# Patient Record
Sex: FEMALE | Race: WHITE | NOT HISPANIC OR LATINO | Employment: FULL TIME | URBAN - METROPOLITAN AREA
[De-identification: names, ages, dates, MRNs, and addresses within clinical notes are randomized per-mention and may not be internally consistent; named-entity substitution may affect disease eponyms.]

---

## 2017-01-21 ENCOUNTER — OFFICE VISIT (OUTPATIENT)
Dept: URGENT CARE | Facility: URGENT CARE | Age: 42
End: 2017-01-21
Payer: COMMERCIAL

## 2017-01-21 VITALS
OXYGEN SATURATION: 99 % | HEART RATE: 61 BPM | BODY MASS INDEX: 31.07 KG/M2 | HEIGHT: 68 IN | DIASTOLIC BLOOD PRESSURE: 74 MMHG | SYSTOLIC BLOOD PRESSURE: 102 MMHG | TEMPERATURE: 98.2 F | WEIGHT: 205 LBS

## 2017-01-21 DIAGNOSIS — R05.9 COUGH: Primary | ICD-10-CM

## 2017-01-21 DIAGNOSIS — J06.9 VIRAL URI: ICD-10-CM

## 2017-01-21 PROCEDURE — 99203 OFFICE O/P NEW LOW 30 MIN: CPT | Performed by: FAMILY MEDICINE

## 2017-01-21 RX ORDER — CODEINE PHOSPHATE AND GUAIFENESIN 10; 100 MG/5ML; MG/5ML
1-2 SOLUTION ORAL EVERY 6 HOURS PRN
Qty: 120 ML | Refills: 0 | Status: SHIPPED | OUTPATIENT
Start: 2017-01-21 | End: 2022-12-06

## 2017-01-21 RX ORDER — PREDNISONE 20 MG/1
20 TABLET ORAL 2 TIMES DAILY
Qty: 10 TABLET | Refills: 0 | Status: SHIPPED | OUTPATIENT
Start: 2017-01-21 | End: 2022-12-06

## 2017-01-21 NOTE — Clinical Note
January 21, 2017    Poly Blanco  [address] [facility] [address]            To whom it may concern:     This is to inform you that this patient needs to be off work for three days starting tomorrow due to her illness.   Please let me know if you have any questions.       Sincerely,               Lacy Davidson MD

## 2017-01-21 NOTE — MR AVS SNAPSHOT
"              After Visit Summary   2017    Poly Blanco    MRN: [id number]           Patient Information     Date Of Birth          1975        Visit Information        Provider Department      2017 6:45 PM Lacy Davidson MD [facility]        Today's Diagnoses     Cough    -  1     Viral URI            Follow-ups after your visit        Who to contact     If you have questions or need follow up information about today's clinic visit or your schedule please contact [facility] directly at [phone].  Normal or non-critical lab and imaging results will be communicated to you by [employer], letter or phone within 4 business days after the clinic has received the results. If you do not hear from us within 7 days, please contact the clinic through [employer] or phone. If you have a critical or abnormal lab result, we will notify you by phone as soon as possible.  Submit refill requests through [employer] or call your pharmacy and they will forward the refill request to us. Please allow 3 business days for your refill to be completed.          Additional Information About Your Visit        MyChart Information     [employer] lets you send messages to your doctor, view your test results, renew your prescriptions, schedule appointments and more. To sign up, go to [email or web address] . Click on \"Log in\" on the left side of the screen, which will take you to the Welcome page. Then click on \"Sign up Now\" on the right side of the page.     You will be asked to enter the access code listed below, as well as some personal information. Please follow the directions to create your username and password.     Your access code is: [email or web address]  Expires: 2017  7:24 PM     Your access code will  in 90 days. If you need help or a new code, please call your [facility] clinic or [phone].        Care EveryWhere ID     This is your Care EveryWhere ID. This could be used by " "other organizations to access your [facility] medical records  [id number]        Your Vitals Were     Pulse Temperature Height BMI (Body Mass Index) Pulse Oximetry Breastfeeding?    61 98.2  F (36.8  C) (Tympanic) 5' 8\" (1.727 m) 31.18 kg/m2 99% No       Blood Pressure from Last 3 Encounters:   01/21/17 102/74   07/05/12 105/77   07/05/12 116/75    Weight from Last 3 Encounters:   01/21/17 205 lb (92.987 kg)   07/05/12 210 lb (95.255 kg)   07/05/12 210 lb (95.255 kg)              Today, you had the following     No orders found for display         Today's Medication Changes          These changes are accurate as of: 1/21/17  7:24 PM.  If you have any questions, ask your nurse or doctor.               Start taking these medicines.        Dose/Directions    guaiFENesin-codeine 100-10 MG/5ML Soln solution   Commonly known as:  ROBITUSSIN AC   Used for:  Cough   Started by:  Lacy Davidson MD        Dose:  1-2 tsp.   Take 5-10 mLs by mouth every 6 hours as needed for cough   Quantity:  120 mL   Refills:  0       predniSONE 20 MG tablet   Commonly known as:  DELTASONE   Used for:  Cough   Started by:  Lacy Davidson MD        Dose:  20 mg   Take 1 tablet (20 mg) by mouth 2 times daily   Quantity:  10 tablet   Refills:  0            Where to get your medicines      These medications were sent to [facility] [address] [place] [address] AT [address] [address]    Hours:  24-hours Phone:  [phone]    - predniSONE 20 MG tablet      Some of these will need a paper prescription and others can be bought over the counter.  Ask your nurse if you have questions.     Bring a paper prescription for each of these medications    - guaiFENesin-codeine 100-10 MG/5ML Soln solution             Primary Care Provider Office Phone # Fax #    [facility] [phone]       [place] [address]  [facility] [address]      "   Thank you!     Thank you for choosing [facility]  for your care. Our goal is always to provide you with excellent care. Hearing back from our patients is one way we can continue to improve our services. Please take a few minutes to complete the written survey that you may receive in the mail after your visit with us. Thank you!             Your Updated Medication List - Protect others around you: Learn how to safely use, store and throw away your medicines at www.disposemymeds.org.          This list is accurate as of: 1/21/17  7:24 PM.  Always use your most recent med list.                   Brand Name Dispense Instructions for use    guaiFENesin-codeine 100-10 MG/5ML Soln solution    ROBITUSSIN AC    120 mL    Take 5-10 mLs by mouth every 6 hours as needed for cough       MULTIVITAMIN PO      Take  by mouth.       predniSONE 20 MG tablet    DELTASONE    10 tablet    Take 1 tablet (20 mg) by mouth 2 times daily

## 2017-01-22 NOTE — NURSING NOTE
"Chief Complaint   Patient presents with     Urgent Care     Fever     c/o fever,cough and SOB for 4 days       Initial /74 mmHg  Pulse 61  Temp(Src) 98.2  F (36.8  C) (Tympanic)  Ht 5' 8\" (1.727 m)  Wt 205 lb (92.987 kg)  BMI 31.18 kg/m2  SpO2 99%  Breastfeeding? No Estimated body mass index is 31.18 kg/(m^2) as calculated from the following:    Height as of this encounter: 5' 8\" (1.727 m).    Weight as of this encounter: 205 lb (92.987 kg).  BP completed using cuff size: alverto Monterroso MA    "

## 2017-01-22 NOTE — PROGRESS NOTES
"SUBJECTIVE:   Poly Blanco is a 41 year old female who complains of nasal congestion, runny nose, headache, cough, wheezing, shortness of breath and fevers up to 102.8 degrees for 4 days. She denies a history of chest pain , diarrhea, nausea and vomiting and admits to a history of asthma. Patient denies smoke cigarettes.       OBJECTIVE:  /74 mmHg  Pulse 61  Temp(Src) 98.2  F (36.8  C) (Tympanic)  Ht 5' 8\" (1.727 m)  Wt 205 lb (92.987 kg)  BMI 31.18 kg/m2  SpO2 99%  Breastfeeding? No   She appears well, vital signs are as noted by the nurse. Ears normal.  Throat and pharynx normal.  Neck supple. No adenopathy in the neck. Nose is congested. Sinuses non tender. The chest is clear, without wheezes or rales.      Assessment/Plan:   (R05) Cough  (primary encounter diagnosis)  Comment:   Plan: guaiFENesin-codeine (ROBITUSSIN AC) 100-10         MG/5ML SOLN solution, predniSONE (DELTASONE) 20        MG tablet            (J06.9,  B97.89) Viral URI  Comment:   Plan: continue supportive care as discussed.      Symptomatic therapy suggested: push fluids, rest, use vaporizer or mist needed , use ibuprofen as needed and apply heat to sinuses as needed. Call or return to clinic prn if these symptoms worsen or fail to improve as anticipated.   "

## 2022-06-26 ENCOUNTER — HEALTH MAINTENANCE LETTER (OUTPATIENT)
Age: 47
End: 2022-06-26

## 2022-12-06 ENCOUNTER — VIRTUAL VISIT (OUTPATIENT)
Dept: ENDOCRINOLOGY | Facility: CLINIC | Age: 47
End: 2022-12-06
Payer: COMMERCIAL

## 2022-12-06 ENCOUNTER — TELEPHONE (OUTPATIENT)
Dept: ENDOCRINOLOGY | Facility: CLINIC | Age: 47
End: 2022-12-06

## 2022-12-06 VITALS — HEIGHT: 68 IN | WEIGHT: 222 LBS | BODY MASS INDEX: 33.65 KG/M2

## 2022-12-06 DIAGNOSIS — R63.2 BINGE EATING: ICD-10-CM

## 2022-12-06 DIAGNOSIS — E66.811 CLASS 1 OBESITY DUE TO EXCESS CALORIES WITHOUT SERIOUS COMORBIDITY WITH BODY MASS INDEX (BMI) OF 33.0 TO 33.9 IN ADULT: ICD-10-CM

## 2022-12-06 DIAGNOSIS — E66.09 CLASS 1 OBESITY DUE TO EXCESS CALORIES WITHOUT SERIOUS COMORBIDITY WITH BODY MASS INDEX (BMI) OF 33.0 TO 33.9 IN ADULT: ICD-10-CM

## 2022-12-06 DIAGNOSIS — E66.09 CLASS 1 OBESITY DUE TO EXCESS CALORIES WITHOUT SERIOUS COMORBIDITY WITH BODY MASS INDEX (BMI) OF 33.0 TO 33.9 IN ADULT: Primary | ICD-10-CM

## 2022-12-06 DIAGNOSIS — Z71.3 NUTRITIONAL COUNSELING: Primary | ICD-10-CM

## 2022-12-06 DIAGNOSIS — E66.811 CLASS 1 OBESITY DUE TO EXCESS CALORIES WITHOUT SERIOUS COMORBIDITY WITH BODY MASS INDEX (BMI) OF 33.0 TO 33.9 IN ADULT: Primary | ICD-10-CM

## 2022-12-06 PROCEDURE — 99204 OFFICE O/P NEW MOD 45 MIN: CPT | Mod: GT | Performed by: INTERNAL MEDICINE

## 2022-12-06 PROCEDURE — 97802 MEDICAL NUTRITION INDIV IN: CPT | Mod: GT | Performed by: DIETITIAN, REGISTERED

## 2022-12-06 RX ORDER — MULTIPLE VITAMINS W/ MINERALS TAB 9MG-400MCG
1 TAB ORAL
COMMUNITY

## 2022-12-06 RX ORDER — DEXTROAMPHETAMINE SACCHARATE, AMPHETAMINE ASPARTATE MONOHYDRATE, DEXTROAMPHETAMINE SULFATE AND AMPHETAMINE SULFATE 3.75; 3.75; 3.75; 3.75 MG/1; MG/1; MG/1; MG/1
15 CAPSULE, EXTENDED RELEASE ORAL
COMMUNITY
Start: 2021-02-22 | End: 2023-05-16

## 2022-12-06 RX ORDER — OMEPRAZOLE 40 MG/1
CAPSULE, DELAYED RELEASE ORAL
COMMUNITY
Start: 2022-10-28 | End: 2024-09-26

## 2022-12-06 RX ORDER — TRETINOIN 0.25 MG/G
CREAM TOPICAL
COMMUNITY
Start: 2021-04-20

## 2022-12-06 RX ORDER — TRIAMCINOLONE ACETONIDE 1 MG/G
OINTMENT TOPICAL
COMMUNITY
Start: 2021-01-20

## 2022-12-06 RX ORDER — MULTIVITAMIN
1 TABLET ORAL DAILY
COMMUNITY

## 2022-12-06 RX ORDER — BUPROPION HYDROCHLORIDE 150 MG/1
150 TABLET ORAL EVERY MORNING
COMMUNITY
End: 2023-05-16

## 2022-12-06 NOTE — PATIENT INSTRUCTIONS
Nutrition Goals  Check in with doctor on rechecking calcium in future now that you are not able to eat dairy  Consider paying attention to physical signs of hunger/fulness   Recommend completing eating disorder/disordered eating assessment at [facility] or [facility]. Let me know if you need help with this. This will help us guide next steps.   Establish care with health psychologist (A referral was placed today)    Additional resources:    Building a Plate  [email or web address]    Protein Sources   [email or web address]     Carbohydrates  [email or web address]     Mindful Eating  [email or web address]     Summary of Volumetrics Eating Plan  [email or web address]     Follow-Up:  Tuesday, January 3rd at 2:30 pm     DONNA Moore, RD, LD  Clinic #: [phone]

## 2022-12-06 NOTE — PROGRESS NOTES
Virtual Visit Check-In    During this virtual visit the patient is located in [place], patient verifies this as the location during the entirety of this visit.     Poly is a 47 year old who is being evaluated via a billable video visit.      How would you like to obtain your AVS? MyChart  If the video visit is dropped, the invitation should be resent by: Text to cell phone: [phone]  Will anyone else be joining your video visit? No        Originating Location (pt. Location): Home        Distant Location (provider location):  Off-site    Platform used for Video Visit: Andres Torres NREMT
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
## 2022-12-06 NOTE — PATIENT INSTRUCTIONS
Start Saxenda  Week 1- Inject 0.6 mg daily;   Week 3- Inject 1.2 mg daily;   Week 5- Inject 1.8 mg daily;   Week 7- Inject 2.4 mg daily;   Week 9 and thereafter- Inject 3.0 mg daily thereafter    Refer health psychologist  See PharmD in 6-8 week(s)  See Dr.Tasma BURRELL in 3 month(s)      If you have any questions, please do not hesitate to call Weight management clinic at [phone] or [phone].  If you need to fax, please fax to [phone].    Sincerely,    Sukh Coughlin MD  Endocrinology

## 2022-12-06 NOTE — Clinical Note
KIRBY I recommended pt get an assessment at [facility] or [facility] to see if the binges are just disordered eating or if eating disorder. That can help us better decide next steps for treatment! She is going to see me again next month and try to establish with a [place] therapist as well.
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
## 2022-12-06 NOTE — TELEPHONE ENCOUNTER
Prior Authorization Approval    Authorization Effective Date: 11/6/2022  Authorization Expiration Date: 4/5/2023  Medication: Harpal MAURICE Pending  Approved Dose/Quantity: 15ml per 30 days  Reference #: Key: [id number]   Insurance Company: [employer] ([employer]) - Phone [phone] Fax [phone]  Expected CoPay:       CoPay Card Available:      Foundation Assistance Needed:    Which Pharmacy is filling the prescription (Not needed for infusion/clinic administered):    Pharmacy Notified: Yes  Patient Notified: Yes  Key: [id number]

## 2022-12-06 NOTE — TELEPHONE ENCOUNTER
PA Initiation    Medication: Harpal MAURICE Pending  Insurance Company: [employer] ([employer]) - Phone [phone] Fax [phone]  Pharmacy Filling the Rx:    Filling Pharmacy Phone:    Filling Pharmacy Fax:    Start Date: 12/6/2022    Key: [id number]

## 2022-12-06 NOTE — NURSING NOTE
"Chief Complaint   Patient presents with     Consult     New consultation for weight management.         Vitals:    12/06/22 1147   Weight: 222 lb   Height: 5' 8\"       Body mass index is 33.75 kg/m .      Katy Torres, EMT  [facility]                      "
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
## 2022-12-07 ENCOUNTER — TELEPHONE (OUTPATIENT)
Dept: ENDOCRINOLOGY | Facility: CLINIC | Age: 47
End: 2022-12-07

## 2022-12-15 ENCOUNTER — TELEPHONE (OUTPATIENT)
Dept: ENDOCRINOLOGY | Facility: CLINIC | Age: 47
End: 2022-12-15

## 2022-12-15 NOTE — TELEPHONE ENCOUNTER
MTM referral from: [facility] visit (referral by provider) for 6-8 weeks after staring GLP-1RA    MTM referral outreach attempt #2 on December 15, 2022 at 11:15 AM      Outcome: Patient not reachable after several attempts, will route to MTM Pharmacist/Provider as an FYI.  MTM scheduling number is [phone].  Thank you for the referral.    Patient has no MTM coverage, consult    Christina Patel [facility]

## 2022-12-19 ENCOUNTER — VIRTUAL VISIT (OUTPATIENT)
Dept: BEHAVIORAL HEALTH | Facility: CLINIC | Age: 47
End: 2022-12-19
Attending: INTERNAL MEDICINE
Payer: COMMERCIAL

## 2022-12-19 DIAGNOSIS — F50.810 BINGE-EATING DISORDER, MILD: Primary | ICD-10-CM

## 2022-12-19 PROCEDURE — 90791 PSYCH DIAGNOSTIC EVALUATION: CPT | Mod: GT

## 2022-12-19 ASSESSMENT — ANXIETY QUESTIONNAIRES
7. FEELING AFRAID AS IF SOMETHING AWFUL MIGHT HAPPEN: NOT AT ALL
IF YOU CHECKED OFF ANY PROBLEMS ON THIS QUESTIONNAIRE, HOW DIFFICULT HAVE THESE PROBLEMS MADE IT FOR YOU TO DO YOUR WORK, TAKE CARE OF THINGS AT HOME, OR GET ALONG WITH OTHER PEOPLE: NOT DIFFICULT AT ALL
3. WORRYING TOO MUCH ABOUT DIFFERENT THINGS: NOT AT ALL
GAD7 TOTAL SCORE: 4
4. TROUBLE RELAXING: NEARLY EVERY DAY
2. NOT BEING ABLE TO STOP OR CONTROL WORRYING: NOT AT ALL
GAD7 TOTAL SCORE: 4
7. FEELING AFRAID AS IF SOMETHING AWFUL MIGHT HAPPEN: NOT AT ALL
8. IF YOU CHECKED OFF ANY PROBLEMS, HOW DIFFICULT HAVE THESE MADE IT FOR YOU TO DO YOUR WORK, TAKE CARE OF THINGS AT HOME, OR GET ALONG WITH OTHER PEOPLE?: NOT DIFFICULT AT ALL
5. BEING SO RESTLESS THAT IT IS HARD TO SIT STILL: SEVERAL DAYS
1. FEELING NERVOUS, ANXIOUS, OR ON EDGE: NOT AT ALL
6. BECOMING EASILY ANNOYED OR IRRITABLE: NOT AT ALL
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
## 2022-12-20 ASSESSMENT — COLUMBIA-SUICIDE SEVERITY RATING SCALE - C-SSRS
2. HAVE YOU ACTUALLY HAD ANY THOUGHTS OF KILLING YOURSELF?: NO
TOTAL  NUMBER OF ABORTED OR SELF INTERRUPTED ATTEMPTS LIFETIME: NO
2. HAVE YOU ACTUALLY HAD ANY THOUGHTS OF KILLING YOURSELF?: YES
1. IN THE PAST MONTH, HAVE YOU WISHED YOU WERE DEAD OR WISHED YOU COULD GO TO SLEEP AND NOT WAKE UP?: NO
1. HAVE YOU WISHED YOU WERE DEAD OR WISHED YOU COULD GO TO SLEEP AND NOT WAKE UP?: YES
6. HAVE YOU EVER DONE ANYTHING, STARTED TO DO ANYTHING, OR PREPARED TO DO ANYTHING TO END YOUR LIFE?: NO
TOTAL  NUMBER OF INTERRUPTED ATTEMPTS LIFETIME: NO
ATTEMPT LIFETIME: NO

## 2022-12-21 DIAGNOSIS — F50.810 BINGE-EATING DISORDER, MILD: Primary | ICD-10-CM

## 2023-01-03 ENCOUNTER — VIRTUAL VISIT (OUTPATIENT)
Dept: ENDOCRINOLOGY | Facility: CLINIC | Age: 48
End: 2023-01-03
Payer: COMMERCIAL

## 2023-01-03 VITALS — WEIGHT: 217 LBS | BODY MASS INDEX: 32.99 KG/M2

## 2023-01-03 DIAGNOSIS — R63.2 BINGE EATING: ICD-10-CM

## 2023-01-03 DIAGNOSIS — E66.9 OBESITY: ICD-10-CM

## 2023-01-03 DIAGNOSIS — Z71.3 NUTRITIONAL COUNSELING: Primary | ICD-10-CM

## 2023-01-03 PROCEDURE — 97803 MED NUTRITION INDIV SUBSEQ: CPT | Mod: GT | Performed by: DIETITIAN, REGISTERED
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
## 2023-01-03 NOTE — PATIENT INSTRUCTIONS
Nutrition Goals  Recommend utilizing hunger/satiety scale. Aiming for blue zone as discussed.   Assessment with Hyun Feb 1st   Talk about meal plans/portions in more detail next month     With your recent allergy testing I also wanted to add some non-dairy sources of calcium for us to keep in mind for you:     Non-dairy calcium sources:   Seeds (poppy, sesame, celery, yosvany)  Beans/lentils   Almonds  Leafy greens   Edamame and tofu  Figs    Additional resources:    Building a Plate  [email or web address]    Protein Sources   [email or web address]     Carbohydrates  [email or web address]     Mindful Eating  [email or web address]     Summary of Volumetrics Eating Plan  [email or web address]     Follow-Up:  Friday, Feb 10th at 9:00 am     DONNA Moore, RD, LD  Clinic #: [phone]
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
## 2023-01-04 ENCOUNTER — VIRTUAL VISIT (OUTPATIENT)
Dept: BEHAVIORAL HEALTH | Facility: CLINIC | Age: 48
End: 2023-01-04
Payer: COMMERCIAL

## 2023-01-04 DIAGNOSIS — F50.810 BINGE-EATING DISORDER, MILD: Primary | ICD-10-CM

## 2023-01-04 PROCEDURE — 90832 PSYTX W PT 30 MINUTES: CPT | Mod: GT

## 2023-01-04 NOTE — PROGRESS NOTES
[facility]  January 4, 2023      Behavioral Health Clinician Progress Note    Patient Name: Poly Blanco           Service Type:  Individual      Service Location:   MyChart / Email (patient reached)     Session Start Time: 1:00 PM  Session End Time: 1:28 PM      Session Length: 16 - 37      Attendees: Patient     Service Modality:  Video Visit:      Provider verified identity through the following two step process.  Patient provided:  Patient is known previously to provider    Telemedicine Visit: The patient's condition can be safely assessed and treated via synchronous audio and visual telemedicine encounter.      Reason for Telemedicine Visit: Patient has requested telehealth visit    Originating Site (Patient Location): Patient's home    Distant Site (Provider Location): [facility]    Consent:  The patient/guardian has verbally consented to: the potential risks and benefits of telemedicine (video visit) versus in person care; bill my insurance or make self-payment for services provided; and responsibility for payment of non-covered services.     Patient would like the video invitation sent by:  My Chart    Mode of Communication:  Video Conference via Amwell    Distant Location (Provider):  On-site    As the provider I attest to compliance with applicable laws and regulations related to telemedicine.    Visit Activities (Refresh list every visit): [facility] Only    Diagnostic Assessment Date: 12/19/22  Treatment Plan Review Date: TBD  See Flowsheets for today's PHQ-9 and CIARAN-7 results  Previous PHQ-9: No flowsheet data found.  Previous CIARAN-7:   CIARAN-7 SCORE 12/19/2022   Total Score 4 (minimal anxiety)   Total Score 4       LANA LEVEL:  No flowsheet data found.    DATA  Extended Session (60+ minutes): No  Interactive Complexity: No  Crisis: No  [facility] Patient: No    Treatment Objective(s) Addressed in This Session:  Target  Behavior(s): disease management/lifestyle changes .    Reduce/address binge eating behaviors and increasing stress management.     Current Stressors / Issues:    [facility] met with pt virtually on this date. Pt was recently diagnosed with Covid-19 and states that she is currently recovering. Pt states that isolating as home has been difficult due to reduction in physical activity which is a primary self-care activity for pt. In regards to binge eating behavior, pt states that she has only binged once since 12/19/22. Pt attributes this to stress related to covid-19 diagnosis. Pt continues to work with the [facility] weight management clinic and states that she has found it helpful.     Pt states that her partner's cognitive impairtment is significantly impacting their relationship. Pt expressed ambivalence about the relationship and how she wants to proceed. Pt became tearful and processed the grief related to her partner's cognitive decline, specifically grieving his cognition and that he is not the same person functionally as he was a year ago. [facility] provided emotional support.     [facility] and pt continue to discuss self-care and prioritizing her needs. When pt is well, self-care includes: doing pilates 4 times per week, doing cardio regularly. Pt states that she would like to prioritize friendships and meaningful relationships outside of her relationship with her partner. Pt reports she will follow-up with PCP regarding physical activity and what is permitted with covid-19 history.     Progress on Treatment Objective(s) / Homework:  New Objective established this session - ACTION (Actively working towards change); Intervened by reinforcing change plan / affirming steps taken    Motivational Interviewing    MI Intervention: Expressed Empathy/Understanding, Permission to raise concern or advise, Open-ended questions, Reflections: simple and complex and Change talk (evoked)     Change Talk Expressed by the Patient: Need to  change Committment to change Activation    Provider Response to Change Talk: E - Evoked more info from patient about behavior change, A - Affirmed patient's thoughts, decisions, or attempts at behavior change, R - Reflected patient's change talk and S - Summarized patient's change talk statements      Care Plan review completed: Yes    Medication Review:  No changes to current psychiatric medication(s)    Medication Compliance:  Yes    Changes in Health Issues:   Yes: Pt recently diagnosed with Covid-19. Recently dx of Eosinophilic esophagitis. Currently established at the [facility] weight management clinic in [place].     Chemical Use Review:   Substance Use: Chemical use reviewed, no active concerns identified      Tobacco Use: No current tobacco use.      Assessment: Current Emotional / Mental Status (status of significant symptoms):  Risk status (Self / Other harm or suicidal ideation)  Patient has had a history of suicidal ideation: hx of passive SI  Patient denies current fears or concerns for personal safety.  Patient denies current or recent suicidal ideation or behaviors.  Patient denies current or recent homicidal ideation or behaviors.  Patient denies current or recent self injurious behavior or ideation.  Patient denies other safety concerns.  A safety and risk management plan has not been developed at this time, however patient was encouraged to call [facility] should there be a change in any of these risk factors.    Appearance:   Appropriate   Eye Contact:   Good   Psychomotor Behavior: Normal   Attitude:   Cooperative  Interested Pleasant  Orientation:   All  Speech   Rate / Production: Normal/ Responsive   Volume:  Normal   Mood:    Sad   Affect:    Tearful  Thought Content:  Clear   Thought Form:  Coherent  Logical   Insight:    Good     Diagnoses:  1. Binge-eating disorder, mild        Collateral Reports Completed:  Not Applicable    Plan: (Homework, other):  Patient was given  information about behavioral services and encouraged to schedule a follow up appointment with the clinic [facility] in 2 weeks.  She was also given information about mental health symptoms and treatment options . [facility] encouraged pt to continue exploring self-care options and intentionality. Pt has an initial screening with [facility] on 23. Pt is starting with individual therapy on 23 with Anastasia Valentine within [facility]. CD Recommendations: No indications of CD issues.      LUBA Salmeron, [facility]  Behavioral Health Clinician  [facility], [address], [address], [address], [place], [address]  [phone]    2023

## 2023-01-19 ENCOUNTER — VIRTUAL VISIT (OUTPATIENT)
Dept: BEHAVIORAL HEALTH | Facility: CLINIC | Age: 48
End: 2023-01-19
Payer: COMMERCIAL

## 2023-01-19 DIAGNOSIS — F50.810 BINGE-EATING DISORDER, MILD: Primary | ICD-10-CM

## 2023-01-19 PROCEDURE — 90834 PSYTX W PT 45 MINUTES: CPT | Mod: GT

## 2023-01-19 NOTE — PROGRESS NOTES
[facility]: Integrated Behavioral Health     January 19th, 2023      Behavioral Health Clinician Progress Note    Patient Name: Poly Blanco           Service Type:  Individual      Service Location:   MyChart / Email (patient reached)     Session Start Time: 11:32 AM   Session End Time: 12:21  PM      Session Length: 38 - 52      Attendees: Patient     Service Modality:  Video Visit:      Provider verified identity through the following two step process.  Patient provided:  Patient is known previously to provider    Telemedicine Visit: The patient's condition can be safely assessed and treated via synchronous audio and visual telemedicine encounter.      Reason for Telemedicine Visit: Patient has requested telehealth visit    Originating Site (Patient Location): Patient's home    Distant Site (Provider Location): [facility]    Consent:  The patient/guardian has verbally consented to: the potential risks and benefits of telemedicine (video visit) versus in person care; bill my insurance or make self-payment for services provided; and responsibility for payment of non-covered services.     Patient would like the video invitation sent by:  My Chart    Mode of Communication:  Video Conference via [facility]    Distant Location (Provider):  On-site    As the provider I attest to compliance with applicable laws and regulations related to telemedicine.    Visit Activities (Refresh list every visit): [facility]    Diagnostic Assessment Date: 12/19/22  Treatment Plan Review Date: D- [facility] to address during next visit   See Flowsheets for today's PHQ-9 and CIARAN-7 results  Previous PHQ-9: No flowsheet data found.  Previous CIARAN-7:   CIARAN-7 SCORE 12/19/2022   Total Score 4 (minimal anxiety)   Total Score 4       LANA LEVEL:  No flowsheet data found.    DATA  Extended Session (60+ minutes): No  Interactive Complexity: No  Crisis: No  [facility] Patient: No    Treatment Objective(s)  Addressed in This Session:  Target Behavior(s): disease management/lifestyle changes .    Reduce/address binge eating behaviors and increasing stress management.     Current Stressors / Issues:    Update: [facility] met with pt virtually on this date. Pt endorses that she seels better physically, starting to exercise lightly which is helpful. Partner continues to decline cognitively and pt took a significant amount of time to process his continued decline and how that affects her and their relationship. Discussed responsibility vs obligation to partner.     Self-care continues to be discussed- including setting/maintaing boundaries. Pt states that she is continuing to prioritize self-care activities like spending time with friends. Pt declines concerns regarding binging since last session. Pt is looking forward to screening with [facility] on 2/01/23.     Progress on Treatment Objective(s) / Homework:  Satisfactory progress - ACTION (Actively working towards change); Intervened by reinforcing change plan / affirming steps taken    Motivational Interviewing    MI Intervention: Expressed Empathy/Understanding, Permission to raise concern or advise, Open-ended questions, Reflections: simple and complex and Change talk (evoked)     Change Talk Expressed by the Patient: Need to change Committment to change Activation    Provider Response to Change Talk: E - Evoked more info from patient about behavior change, A - Affirmed patient's thoughts, decisions, or attempts at behavior change, R - Reflected patient's change talk and S - Summarized patient's change talk statements      Care Plan review completed: Yes    Medication Review:  No changes to current psychiatric medication(s)    Medication Compliance:  Yes    Changes in Health Issues:   Yes:Recently dx of Eosinophilic esophagitis. Currently established at the [facility] weight management clinic in [place].     Chemical Use Review:   Substance Use: Chemical use  reviewed, no active concerns identified      Tobacco Use: No current tobacco use.      Assessment: Current Emotional / Mental Status (status of significant symptoms):  Risk status (Self / Other harm or suicidal ideation)  Patient has had a history of suicidal ideation: hx of passive SI  Patient denies current fears or concerns for personal safety.  Patient denies current or recent suicidal ideation or behaviors.  Patient denies current or recent homicidal ideation or behaviors.  Patient denies current or recent self injurious behavior or ideation.  Patient denies other safety concerns.  A safety and risk management plan has not been developed at this time, however patient was encouraged to call [place] [phone] should there be a change in any of these risk factors.    Appearance:   Appropriate   Eye Contact:   Good   Psychomotor Behavior: Normal   Attitude:   Cooperative  Interested Pleasant  Orientation:   All  Speech   Rate / Production: Normal/ Responsive   Volume:  Normal   Mood:    Sad   Affect:    Tearful  Thought Content:  Clear   Thought Form:  Coherent  Logical   Insight:    Good     Diagnoses:  1. Binge-eating disorder, mild        Collateral Reports Completed:  Not Applicable    Plan: (Homework, other):  Patient was given information about behavioral services and encouraged to schedule a follow up appointment with the clinic [facility] in 2 weeks.  She was also given information about mental health symptoms and treatment options . [facility] encouraged pt to continue exploring self-care options and intentionality. Pt has an initial screening with [facility] on 2/01/23. Pt is starting with individual therapy on 5/02/23 with Anastasia Valentine within [facility]. CD Recommendations: No indications of CD issues.      LUBA Salmeron, [facility]  Behavioral Health Clinician  [facility] [address], [address], [address], [address], [place], [place], [address]  Scheduling:  [phone]    January 19th, 2023

## 2023-02-09 ENCOUNTER — MYC MEDICAL ADVICE (OUTPATIENT)
Dept: ENDOCRINOLOGY | Facility: CLINIC | Age: 48
End: 2023-02-09
Payer: COMMERCIAL

## 2023-02-09 DIAGNOSIS — E66.811 CLASS 1 OBESITY DUE TO EXCESS CALORIES WITHOUT SERIOUS COMORBIDITY WITH BODY MASS INDEX (BMI) OF 33.0 TO 33.9 IN ADULT: ICD-10-CM

## 2023-02-09 DIAGNOSIS — E66.09 CLASS 1 OBESITY DUE TO EXCESS CALORIES WITHOUT SERIOUS COMORBIDITY WITH BODY MASS INDEX (BMI) OF 33.0 TO 33.9 IN ADULT: ICD-10-CM

## 2023-02-10 ENCOUNTER — TELEPHONE (OUTPATIENT)
Dept: ENDOCRINOLOGY | Facility: CLINIC | Age: 48
End: 2023-02-10

## 2023-02-10 ENCOUNTER — VIRTUAL VISIT (OUTPATIENT)
Dept: ENDOCRINOLOGY | Facility: CLINIC | Age: 48
End: 2023-02-10
Payer: COMMERCIAL

## 2023-02-10 VITALS — WEIGHT: 214 LBS | BODY MASS INDEX: 32.54 KG/M2

## 2023-02-10 DIAGNOSIS — E66.09 CLASS 1 OBESITY DUE TO EXCESS CALORIES WITHOUT SERIOUS COMORBIDITY WITH BODY MASS INDEX (BMI) OF 33.0 TO 33.9 IN ADULT: Primary | ICD-10-CM

## 2023-02-10 DIAGNOSIS — E66.811 CLASS 1 OBESITY DUE TO EXCESS CALORIES WITHOUT SERIOUS COMORBIDITY WITH BODY MASS INDEX (BMI) OF 33.0 TO 33.9 IN ADULT: Primary | ICD-10-CM

## 2023-02-10 DIAGNOSIS — E66.9 OBESITY: ICD-10-CM

## 2023-02-10 DIAGNOSIS — R63.2 BINGE EATING: ICD-10-CM

## 2023-02-10 DIAGNOSIS — Z71.3 NUTRITIONAL COUNSELING: Primary | ICD-10-CM

## 2023-02-10 PROCEDURE — 97803 MED NUTRITION INDIV SUBSEQ: CPT | Mod: VID | Performed by: DIETITIAN, REGISTERED

## 2023-02-10 RX ORDER — SEMAGLUTIDE 1 MG/.5ML
1 INJECTION, SOLUTION SUBCUTANEOUS WEEKLY
Qty: 6 ML | Refills: 0 | Status: SHIPPED | OUTPATIENT
Start: 2023-02-10 | End: 2023-05-08 | Stop reason: DRUGHIGH

## 2023-02-10 RX ORDER — SEMAGLUTIDE 0.25 MG/.5ML
0.25 INJECTION, SOLUTION SUBCUTANEOUS WEEKLY
Qty: 2 ML | Refills: 0 | Status: SHIPPED | OUTPATIENT
Start: 2023-02-10 | End: 2023-05-08 | Stop reason: DRUGHIGH

## 2023-02-10 RX ORDER — SEMAGLUTIDE 0.5 MG/.5ML
0.5 INJECTION, SOLUTION SUBCUTANEOUS WEEKLY
Qty: 2 ML | Refills: 0 | Status: SHIPPED | OUTPATIENT
Start: 2023-02-10 | End: 2023-05-08 | Stop reason: DRUGHIGH
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
## 2023-02-10 NOTE — TELEPHONE ENCOUNTER
PA needed for Wegovy. Please submit PA and let Liaison know when Approved / Denied    PAID / MED[employer]  BIN: [id number]  ID: [id number]  GROUP: [id number]  PCN: [id number]
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
## 2023-02-10 NOTE — TELEPHONE ENCOUNTER
[facility] calling regarding patients liraglutide - Weight Management (SAXENDA) 18 MG/3ML pen. They need to clarify dosing & frequency     [phone] ask for pharmacist

## 2023-02-12 NOTE — PATIENT INSTRUCTIONS
Nutrition Goals  Consider getting an airfryer  Consider meal delivery services     Examples: Home , Purple   https://www.purplecarrot.Living Lens Enterprise/weekly-menu/prepared-meals, Hungry Root, Factor 75 https://www.ewkyqw02.com/weekly-menu, Fresh and lean Fresh n Lean https://www.Wysada.com/tastefresh/brand/?utm_source=SegmentFault&utm_term=fresh%20and%20lean&utm_campaign=0998251992&utm_content=02864147501&gclid=[id number]      Faster meal ideas:     -Rotisserie chicken from Cub     - Microwavable grain packs such as https://TotalHousehold.Appcore/products or https://Sensiotec/product-category/minute-ready-to-serve/?s_post_type=products&s_grain_type=0     - Salad kit with canned or already made microwavable packs of lentils/beans or chicken thrown on top    - Chicken Patties with feta/spinach (some dairy) - they are raw but fast/easy to pan koch as a potential idea https://www.eBureau/p/mighty-spark-spinach-38-feta-chicken-patties-8oz/-/A-49902793    - Veggie Hemp Bowl (Gluten Free and Vegan so no dairy or eggs) https://www.eBureau/p/tattooed--gluten-free-and-vegan-frozen-veggie-hemp-bowl-8-5oz/-/A-81298510       Non-dairy calcium sources:   Seeds (poppy, sesame, celery, yosvany)  Beans/lentils   Almonds  Leafy greens   Edamame and tofu  Figs    Additional resources:    Building a Plate  Http://www.[email or web address].pdf    Protein Sources   http://OkBuy.com/869223.pdf     Carbohydrates  http://[email or web address]     Mindful Eating  http://[email or web address]     Summary of Volumetrics Eating Plan  http://[email or web address]     DONNA Moore, RD, LD  Clinic #: [phone]

## 2023-02-13 ENCOUNTER — VIRTUAL VISIT (OUTPATIENT)
Dept: BEHAVIORAL HEALTH | Facility: CLINIC | Age: 48
End: 2023-02-13
Payer: COMMERCIAL

## 2023-02-13 DIAGNOSIS — F50.810 BINGE-EATING DISORDER, MILD: Primary | ICD-10-CM

## 2023-02-13 PROCEDURE — 90834 PSYTX W PT 45 MINUTES: CPT | Mod: VID

## 2023-02-13 NOTE — PROGRESS NOTES
[facility]     February 13th, 2023      Behavioral Health Clinician Progress Note    Patient Name: Poly Blanco           Service Type:  Individual      Service Location:   MyChart / Email (patient reached)     Session Start Time: 2:32 PM  Session End Time: 3: 20 PM      Session Length: 38 - 52      Attendees: Patient     Service Modality:  Video Visit:      Provider verified identity through the following two step process.  Patient provided:  Patient is known previously to provider    Telemedicine Visit: The patient's condition can be safely assessed and treated via synchronous audio and visual telemedicine encounter.      Reason for Telemedicine Visit: Patient has requested telehealth visit    Originating Site (Patient Location): Patient's home    Distant Site (Provider Location): [facility]    Consent:  The patient/guardian has verbally consented to: the potential risks and benefits of telemedicine (video visit) versus in person care; bill my insurance or make self-payment for services provided; and responsibility for payment of non-covered services.     Patient would like the video invitation sent by:  My Chart    Mode of Communication:  Video Conference via Amwell    Distant Location (Provider):  On-site    As the provider I attest to compliance with applicable laws and regulations related to telemedicine.    Visit Activities (Refresh list every visit): [facility]    Diagnostic Assessment Date: 12/19/22  Treatment Plan Review Date:   See Flowsheets for today's PHQ-9 and CIARAN-7 results  Previous PHQ-9: No flowsheet data found.  Previous CIARAN-7:   CIARAN-7 SCORE 12/19/2022   Total Score 4 (minimal anxiety)   Total Score 4       LANA LEVEL:  No flowsheet data found.    DATA  Extended Session (60+ minutes): No  Interactive Complexity: No  Crisis: No  [facility] Patient: No    Treatment Objective(s) Addressed in This Session:  Target  Behavior(s): disease management/lifestyle changes .    Reduce/address binge eating behaviors and increasing stress management.     Current Stressors / Issues:    Update:     C met with pt virtually on this date. Pt states that she did completed intake assessment with [facility] and states that her diagnosis of binge eating disorder was confirmed. Pt states that the did recommend outpatient programming, but want her to discontinue the medications that she started through the weight management program. Pt endorses ambivalence about this and is unsure at this time if she wants to discontinue her medications. [facility] and pt discussed other possible options including [facility] which has a DBT group for binge eating disorder. Pt states that she will consider this option.     Pt took time on this date to process through her relationship with partner and continue concerns with his cognitive impairment and memory decline. Pt continues to process potential care giving role, and ambivalence about this role as her partner's cognition continues to decline.     [facility] and pt continue to discuss pt's self-care to avoid caregiver burnout and resentment. [facility] and pt also took time processing pt's avoidance of conflict and internalizing her partner's anger when they are in a disagreement. Pt states that she will become dysregulated and that her sleep can be affected. [facility] and pt discussed journaling her feelings and processing her thoughts after an argument on paperwork. [facility] and pt also discussed pt being mindful of her thought process during this time and what she is noticing in her body. Pt to start to track this information before next session. [facility] and pt to view distress tolerance skills.     Progress on Treatment Objective(s) / Homework:  Satisfactory progress - ACTION (Actively working towards change); Intervened by reinforcing change plan / affirming steps taken    Motivational Interviewing    MI Intervention:  Expressed Empathy/Understanding, Permission to raise concern or advise, Open-ended questions, Reflections: simple and complex and Change talk (evoked)     Change Talk Expressed by the Patient: Need to change Committment to change Activation    Provider Response to Change Talk: E - Evoked more info from patient about behavior change, A - Affirmed patient's thoughts, decisions, or attempts at behavior change, R - Reflected patient's change talk and S - Summarized patient's change talk statements      Care Plan review completed: Yes    Medication Review:  No changes to current psychiatric medication(s)    Medication Compliance:  Yes    Changes in Health Issues:   Yes:Recently dx of Eosinophilic esophagitis. Currently established at the [facility] weight management clinic in [place].     Chemical Use Review:   Substance Use: Chemical use reviewed, no active concerns identified      Tobacco Use: No current tobacco use.      Assessment: Current Emotional / Mental Status (status of significant symptoms):  Risk status (Self / Other harm or suicidal ideation)  Patient has had a history of suicidal ideation: hx of passive SI  Patient denies current fears or concerns for personal safety.  Patient denies current or recent suicidal ideation or behaviors.  Patient denies current or recent homicidal ideation or behaviors.  Patient denies current or recent self injurious behavior or ideation.  Patient denies other safety concerns.  A safety and risk management plan has not been developed at this time, however patient was encouraged to call [facility] should there be a change in any of these risk factors.    Appearance:   Appropriate   Eye Contact:   Good   Psychomotor Behavior: Normal   Attitude:   Cooperative  Interested Pleasant  Orientation:   All  Speech   Rate / Production: Normal/ Responsive   Volume:  Normal   Mood:    Sad   Affect:    Tearful  Thought Content:  Clear   Thought Form:  Coherent  Logical    Insight:    Good     Diagnoses:  1. Binge-eating disorder, mild        Collateral Reports Completed:  Not Applicable    Plan: (Homework, other):  Patient was given information about behavioral services and encouraged to schedule a follow up appointment with the clinic [facility] in 2 weeks.  She was also given information about mental health symptoms and treatment options . [facility] encouraged pt to continue exploring self-care options and intentionality. Pt also encouraged to start journaling again and tracking her thought process and physical responses to arguments with her partner. Pt is starting with individual therapy on 5/02/23 with Anastasia Valentine within [facility]. CD Recommendations: No indications of CD issues.      Individual Treatment Plan    Patient's Name: Poly Blanco  YOB: 1975    Date of Creation: 2/13/23  Date Treatment Plan Last Reviewed/Revised: n/a    DSM5 Diagnoses: 12/19/22  Psychosocial / Contextual Factors: partner's health   PROMIS (reviewed every 90 days): 12/19/22. Review due 3/19/23    Referral / Collaboration:  Pt is starting with individual therapy on 5/02/23 with Anastasia Valentine within [facility]. Pt is currently exploring [facility] vs alternative option to address her binge eating disorder.     Anticipated number of session for this episode of care: bridge until first appointment with long-term therapist.   Anticipation frequency of session: Every other week  Anticipated Duration of each session: 38-52 minutes  Treatment plan will be reviewed in 90 days or when goals have been changed.       MeasurableTreatment Goal(s) related to diagnosis / functional impairment(s)  Goal 1: Patient will increase self-care activities along with a corresponding increase in positive emotion and life satisfaction.    Objective #A (Patient Action)    Patient will Increase interest, engagement, and pleasure in doing things.  Status: Continued - Date(s): 2/13/23    Intervention(s)  Therapist will help  patient identify pleasant and mastery oriented events that elicit positive, relaxed mood.    Objective #B  Patient will Decrease frequency and intensity of feeling down, depressed, hopeless.  Status: Continued - Date(s): 2/13/23    Intervention(s)  Therapist will introduce patient to cognitive-behavioral and acceptance and commitment therapy topics aimed to help reduce depression and anxiety    Objective #C  Patient will Identify negative self-talk and behaviors: challenge core beliefs, myths, and actions  Improve concentration, focus, and mindfulness in daily activities .  Status: Continued - Date(s): 2/13/23    Intervention(s)  Therapist will help patient identify and manage negative self-talk and automatic thoughts; introduce patient to cognitive distortions; help patient develop cognitive diffusion techniques        Other Possible Therapeutic Intervention(s):    Psycho-education regarding mental health diagnoses and treatment options    Supportive Therapy    Provide affirmations, reflections, and establish working rapport    Emphasize and reflect on strength of therapeutic relationship    Skills training    Explore skills useful to client in current situation.    Skills include assertiveness, communication, conflict management, problem-solving, relaxation, etc.    Solution-Focused Therapy    Explore patterns in patient's relationships and discuss options for new behaviors.    Explore patterns in patient's actions and choices and discuss options for new behaviors.    Cognitive-behavioral Therapy    Discuss common cognitive distortions, identify them in patient's life.    Explore ways to challenge, replace, and act against these cognitions.    Acceptance and Commitment Therapy    Explore and identify important values in patient's life.    Discuss ways to commit to behavioral activation around these values.    Psychodynamic psychotherapy    Discuss patient's emotional dynamics and issues and how they impact  behaviors.    Explore patient's history of relationships and how they impact present behaviors.    Explore how to work with and make changes in these schemas and patterns.    Narrative Therapy    Explore the patient's story of his/her life from his/her perspective.    Explore alternate ways of understanding their experience, identifying exceptions, developing new themes.    Interpersonal Psychotherapy    Explore patterns in relationships that are effective or ineffective at helping patient reach their goals, find satisfying experience.    Discuss new patterns or behaviors to engage in for improved social functioning.    Behavioral Activation    Discuss steps patient can take to become more involved in meaningful activity.    Identify barriers to these activities and explore possible solutions.    Mindfulness-Based Strategies    Discuss skills based on development and application of mindfulness.    Skills drawn from compassion-focused therapy, dialectical behavior therapy, mindfulness-based stress reduction, mindfulness-based cognitive therapy, etc.      Patient has reviewed and agreed to the above plan.        LUBA Salmeron, [facility]  Behavioral Health Clinician  [facility], [address] [place], [place] [address]e. S, [address], [address], [place], [place], [address]  [phone]    2023

## 2023-02-15 NOTE — TELEPHONE ENCOUNTER
PA Initiation    Medication: Semaglutide-Weight Management (WEGOVY) 0.25 MG/0.5ML SOAJ   Insurance Company: [employer] - Phone [phone] Fax [phone]  Pharmacy Filling the Rx: [facility] [id number]  Filling Pharmacy Phone: [phone]  Filling Pharmacy Fax: [phone]  Start Date: 2/14/2023

## 2023-02-15 NOTE — TELEPHONE ENCOUNTER
Prior Authorization Approval    Authorization Effective Date: 1/16/2023  Authorization Expiration Date: 9/13/2023  Medication: Semaglutide-Weight Management (WEGOVY) 0.25 MG/0.5ML SOAJ--APPROVED  Approved Dose/Quantity:   Reference #:     Insurance Company: [employer] - Phone [phone] Fax [phone]  Expected CoPay:       CoPay Card Available:      Foundation Assistance Needed:    Which Pharmacy is filling the prescription (Not needed for infusion/clinic administered): [id number] - [place] [phone]  Pharmacy Notified: Yes  Patient Notified: Yes **Instructed pharmacy to notify patient when script is ready to /ship.**

## 2023-02-17 ENCOUNTER — TELEPHONE (OUTPATIENT)
Dept: ENDOCRINOLOGY | Facility: CLINIC | Age: 48
End: 2023-02-17
Payer: COMMERCIAL

## 2023-02-17 NOTE — TELEPHONE ENCOUNTER
FREDY and sent [employer] for scheduling Return in about 4 weeks (around 3/10/2023) with Vandana Real.

## 2023-03-03 ENCOUNTER — VIRTUAL VISIT (OUTPATIENT)
Dept: BEHAVIORAL HEALTH | Facility: CLINIC | Age: 48
End: 2023-03-03
Payer: COMMERCIAL

## 2023-03-03 DIAGNOSIS — F50.810 BINGE-EATING DISORDER, MILD: Primary | ICD-10-CM

## 2023-03-03 PROCEDURE — 90834 PSYTX W PT 45 MINUTES: CPT | Mod: VID

## 2023-03-03 NOTE — PROGRESS NOTES
[facility] Primary Care: Integrated Behavioral Health     March 3rd, 2023      Behavioral Health Clinician Progress Note    Patient Name: Poly Blanco           Service Type:  Individual      Service Location:   MyChart / Email (patient reached)     Session Start Time: 11:04 AM  Session End Time: 11:52 AM      Session Length: 38 - 52      Attendees: Patient     Service Modality:  Video Visit:      Provider verified identity through the following two step process.  Patient provided:  Patient is known previously to provider    Telemedicine Visit: The patient's condition can be safely assessed and treated via synchronous audio and visual telemedicine encounter.      Reason for Telemedicine Visit: Patient has requested telehealth visit    Originating Site (Patient Location): Patient's home    Distant Site (Provider Location): [facility]    Consent:  The patient/guardian has verbally consented to: the potential risks and benefits of telemedicine (video visit) versus in person care; bill my insurance or make self-payment for services provided; and responsibility for payment of non-covered services.     Patient would like the video invitation sent by:  My Chart    Mode of Communication:  Video Conference via [facility]    Distant Location (Provider):  On-site    As the provider I attest to compliance with applicable laws and regulations related to telemedicine.    Visit Activities (Refresh list every visit): [facility] Only    Diagnostic Assessment Date: 12/19/22  Treatment Plan Review Date: 5/13/23  See Flowsheets for today's PHQ-9 and CIARAN-7 results  Previous PHQ-9: No flowsheet data found.  Previous CIARAN-7:   CIARAN-7 SCORE 12/19/2022   Total Score 4 (minimal anxiety)   Total Score 4       LANA LEVEL:  No flowsheet data found.    DATA  Extended Session (60+ minutes): No  Interactive Complexity: No  Crisis: No  [facility] Patient: No    Treatment Objective(s) Addressed in This Session:  Target  Behavior(s): disease management/lifestyle changes .    Reduce/address binge eating behaviors and increasing stress management.     Current Stressors / Issues:    Update: Pt states that she engaged in conversation with NP about possibility of starting with an anti-depressant. However, pt continues to express ambivalence as she endorses that she is feeling her mood has improved since our last visit. Pt continues to process through her relationship and her sense of responsibility to partner and ambivalence about caregiver role. Pt did reach out to a friend in a similar situation and has an appointment scheduled to meet with this person to ask questions freely about what to expect as cognitive decline persists.  [facility] and pt discussed communication, conflict, and self-worth.     Self-care: scheduled a massage, facial. Pt bought journals. Pt states that she is starting to process her emotions on paper. Exercising regularly. No binging behavior recently. Pt states that she is still the the process of exploring [facility] D[facility] program.     Progress on Treatment Objective(s) / Homework:  Satisfactory progress - ACTION (Actively working towards change); Intervened by reinforcing change plan / affirming steps taken    Motivational Interviewing    MI Intervention: Expressed Empathy/Understanding, Permission to raise concern or advise, Open-ended questions, Reflections: simple and complex and Change talk (evoked)     Change Talk Expressed by the Patient: Need to change Committment to change Activation    Provider Response to Change Talk: E - Evoked more info from patient about behavior change, A - Affirmed patient's thoughts, decisions, or attempts at behavior change, R - Reflected patient's change talk and S - Summarized patient's change talk statements     Solution-Focused Therapy    Explore patterns in patient's relationships and discuss options for new behaviors.    Explore patterns in patient's actions and choices  and discuss options for new behaviors.      Care Plan review completed: Yes    Medication Review:  No changes to current psychiatric medication(s)    Medication Compliance:  Yes    Changes in Health Issues:   Yes:Recently dx of Eosinophilic esophagitis. Currently established at the [facility] weight management clinic in [place].     Chemical Use Review:   Substance Use: Chemical use reviewed, no active concerns identified      Tobacco Use: No current tobacco use.      Assessment: Current Emotional / Mental Status (status of significant symptoms):  Risk status (Self / Other harm or suicidal ideation)  Patient has had a history of suicidal ideation: hx of passive SI  Patient denies current fears or concerns for personal safety.  Patient denies current or recent suicidal ideation or behaviors.  Patient denies current or recent homicidal ideation or behaviors.  Patient denies current or recent self injurious behavior or ideation.  Patient denies other safety concerns.  A safety and risk management plan has not been developed at this time, however patient was encouraged to call [facility] should there be a change in any of these risk factors.    Appearance:   Appropriate   Eye Contact:   Good   Psychomotor Behavior: Normal   Attitude:   Cooperative  Interested Pleasant  Orientation:   All  Speech   Rate / Production: Normal/ Responsive   Volume:  Normal   Mood:    Sad   Affect:    Tearful  Thought Content:  Clear   Thought Form:  Coherent  Logical   Insight:    Good     Diagnoses:  1. Binge-eating disorder, mild        Collateral Reports Completed:  Not Applicable    Plan: (Homework, other):  Patient was given information about behavioral services and encouraged to schedule a follow up appointment with the clinic [facility] in 2 weeks.  She was also given information about mental health symptoms and treatment options . C encouraged pt to continue exploring self-care options and intentionality. Pt also  encouraged to start journaling again and tracking her thought process and physical responses to arguments with her partner. Pt is also going to journal about what she likes about herself and what makes her proud before next session. Pt is starting with individual therapy on 5/02/23 with Anastasia Valentine within [place]. CD Recommendations: No indications of CD issues.      Individual Treatment Plan    Patient's Name: Poly Blanco  YOB: 1975    Date of Creation: 2/13/23  Date Treatment Plan Last Reviewed/Revised: n/a    DSM5 Diagnoses: 12/19/22  Psychosocial / Contextual Factors: partner's health   PROMIS (reviewed every 90 days): 12/19/22. Review due 3/19/23    Referral / Collaboration:  Pt is starting with individual therapy on 5/02/23 with Anastasia Valentine within [place]. Pt is currently exploring [facility] vs alternative option to address her binge eating disorder.     Anticipated number of session for this episode of care: bridge until first appointment with long-term therapist.   Anticipation frequency of session: Every other week  Anticipated Duration of each session: 38-52 minutes  Treatment plan will be reviewed in 90 days or when goals have been changed.       MeasurableTreatment Goal(s) related to diagnosis / functional impairment(s)  Goal 1: Patient will increase self-care activities along with a corresponding increase in positive emotion and life satisfaction.    Objective #A (Patient Action)    Patient will Increase interest, engagement, and pleasure in doing things.  Status: Continued - Date(s): 2/13/23    Intervention(s)  Therapist will help patient identify pleasant and mastery oriented events that elicit positive, relaxed mood.    Objective #B  Patient will Decrease frequency and intensity of feeling down, depressed, hopeless.  Status: Continued - Date(s): 2/13/23    Intervention(s)  Therapist will introduce patient to cognitive-behavioral and acceptance and commitment therapy  topics aimed to help reduce depression and anxiety    Objective #C  Patient will Identify negative self-talk and behaviors: challenge core beliefs, myths, and actions  Improve concentration, focus, and mindfulness in daily activities .  Status: Continued - Date(s): 2/13/23    Intervention(s)  Therapist will help patient identify and manage negative self-talk and automatic thoughts; introduce patient to cognitive distortions; help patient develop cognitive diffusion techniques        Other Possible Therapeutic Intervention(s):    Psycho-education regarding mental health diagnoses and treatment options    Supportive Therapy    Provide affirmations, reflections, and establish working rapport    Emphasize and reflect on strength of therapeutic relationship    Skills training    Explore skills useful to client in current situation.    Skills include assertiveness, communication, conflict management, problem-solving, relaxation, etc.    Solution-Focused Therapy    Explore patterns in patient's relationships and discuss options for new behaviors.    Explore patterns in patient's actions and choices and discuss options for new behaviors.    Cognitive-behavioral Therapy    Discuss common cognitive distortions, identify them in patient's life.    Explore ways to challenge, replace, and act against these cognitions.    Acceptance and Commitment Therapy    Explore and identify important values in patient's life.    Discuss ways to commit to behavioral activation around these values.    Psychodynamic psychotherapy    Discuss patient's emotional dynamics and issues and how they impact behaviors.    Explore patient's history of relationships and how they impact present behaviors.    Explore how to work with and make changes in these schemas and patterns.    Narrative Therapy    Explore the patient's story of his/her life from his/her perspective.    Explore alternate ways of understanding their experience, identifying exceptions,  developing new themes.    Interpersonal Psychotherapy    Explore patterns in relationships that are effective or ineffective at helping patient reach their goals, find satisfying experience.    Discuss new patterns or behaviors to engage in for improved social functioning.    Behavioral Activation    Discuss steps patient can take to become more involved in meaningful activity.    Identify barriers to these activities and explore possible solutions.    Mindfulness-Based Strategies    Discuss skills based on development and application of mindfulness.    Skills drawn from compassion-focused therapy, dialectical behavior therapy, mindfulness-based stress reduction, mindfulness-based cognitive therapy, etc.      Patient has reviewed and agreed to the above plan.        LUBA Salmeron, [facility]  Behavioral Health Clinician  [facility], [address] [place], [place] [address]e. S, [address], [address], [place], [place], [address]  [phone]    2023

## 2023-03-16 ENCOUNTER — TELEPHONE (OUTPATIENT)
Dept: ENDOCRINOLOGY | Facility: CLINIC | Age: 48
End: 2023-03-16
Payer: COMMERCIAL

## 2023-03-16 NOTE — TELEPHONE ENCOUNTER
Need PA renewal for Saxenda. Please submit PA renewal and let Liaison know when Approved / Denied

## 2023-03-17 ENCOUNTER — VIRTUAL VISIT (OUTPATIENT)
Dept: BEHAVIORAL HEALTH | Facility: CLINIC | Age: 48
End: 2023-03-17
Payer: COMMERCIAL

## 2023-03-17 DIAGNOSIS — F50.810 BINGE-EATING DISORDER, MILD: Primary | ICD-10-CM

## 2023-03-17 PROCEDURE — 90834 PSYTX W PT 45 MINUTES: CPT | Mod: VID

## 2023-03-17 NOTE — PROGRESS NOTES
[facility] Primary Care: Integrated Behavioral Health     March 17th, 2023      Behavioral Health Clinician Progress Note    Patient Name: Poly Blanco           Service Type:  Individual      Service Location:   MyChart / Email (patient reached)     Session Start Time: 12:30 PM  Session End Time: 1:22PM      Session Length: 38 - 52      Attendees: Patient     Service Modality:  Video Visit:      Provider verified identity through the following two step process.  Patient provided:  Patient is known previously to provider    Telemedicine Visit: The patient's condition can be safely assessed and treated via synchronous audio and visual telemedicine encounter.      Reason for Telemedicine Visit: Patient has requested telehealth visit    Originating Site (Patient Location): Patient's home    Distant Site (Provider Location): [facility]    Consent:  The patient/guardian has verbally consented to: the potential risks and benefits of telemedicine (video visit) versus in person care; bill my insurance or make self-payment for services provided; and responsibility for payment of non-covered services.     Patient would like the video invitation sent by:  My Chart    Mode of Communication:  Video Conference via [facility]    Distant Location (Provider):  On-site    As the provider I attest to compliance with applicable laws and regulations related to telemedicine.    Visit Activities (Refresh list every visit): [facility] Only    Diagnostic Assessment Date: 12/19/22  Treatment Plan Review Date: 5/13/23  See Flowsheets for today's PHQ-9 and CIARAN-7 results  Previous PHQ-9: No flowsheet data found.  Previous CIARAN-7:   CIARAN-7 SCORE 12/19/2022   Total Score 4 (minimal anxiety)   Total Score 4       LANA LEVEL:  No flowsheet data found.    DATA  Extended Session (60+ minutes): No  Interactive Complexity: No  Crisis: No  [facility] Patient: No    Treatment Objective(s) Addressed in This Session:  Target  Behavior(s): disease management/lifestyle changes .    Reduce/address binge eating behaviors and increasing stress management.     Current Stressors / Issues:      Update:  [facility] met with pt virtually on this date. C and pt took time to discuss core beliefs and pt reports focusing on the positive, postive affirmations, what is going well at work, things she likes about herself.     Pt continues to endorse stress related to partner's health. Pt endorses recent binges over the last several weeks, which she partially attributes to new medication. Pt states that triggers may be habitual and stress induced. . Pt endorses that Sunday nights with her partner can be stressful and may lead to an episode of binging.  [facility] continues to recommend exploring DBT for binge eating program.  Pt is starting a caregiver support group at the [facility]. April 9th.     [facility] and pt continue to discuss conflict resolution and practicing assertiveness.     Progress on Treatment Objective(s) / Homework:  Satisfactory progress - ACTION (Actively working towards change); Intervened by reinforcing change plan / affirming steps taken    Motivational Interviewing    MI Intervention: Expressed Empathy/Understanding, Permission to raise concern or advise, Open-ended questions, Reflections: simple and complex and Change talk (evoked)     Change Talk Expressed by the Patient: Need to change Committment to change Activation    Provider Response to Change Talk: E - Evoked more info from patient about behavior change, A - Affirmed patient's thoughts, decisions, or attempts at behavior change, R - Reflected patient's change talk and S - Summarized patient's change talk statements     Solution-Focused Therapy    Explore patterns in patient's relationships and discuss options for new behaviors.    Explore patterns in patient's actions and choices and discuss options for new behaviors.      Care Plan review completed: Yes    Medication Review:  No  changes to current psychiatric medication(s)    Medication Compliance:  Yes    Changes in Health Issues:   Yes:Recently dx of Eosinophilic esophagitis. Currently established at the [facility] weight management clinic in [place].     Chemical Use Review:   Substance Use: Chemical use reviewed, no active concerns identified      Tobacco Use: No current tobacco use.      Assessment: Current Emotional / Mental Status (status of significant symptoms):  Risk status (Self / Other harm or suicidal ideation)  Patient has had a history of suicidal ideation: hx of passive SI  Patient denies current fears or concerns for personal safety.  Patient denies current or recent suicidal ideation or behaviors.  Patient denies current or recent homicidal ideation or behaviors.  Patient denies current or recent self injurious behavior or ideation.  Patient denies other safety concerns.  A safety and risk management plan has not been developed at this time, however patient was encouraged to call [place] [phone] should there be a change in any of these risk factors.    Appearance:   Appropriate   Eye Contact:   Good   Psychomotor Behavior: Normal   Attitude:   Cooperative  Interested Pleasant  Orientation:   All  Speech   Rate / Production: Normal/ Responsive   Volume:  Normal   Mood:    Sad   Affect:    Tearful  Thought Content:  Clear   Thought Form:  Coherent  Logical   Insight:    Good     Diagnoses:  1. Binge-eating disorder, mild        Collateral Reports Completed:  Not Applicable    Plan: (Homework, other):  Patient was given information about behavioral services and encouraged to schedule a follow up appointment with the clinic [facility] in 2 weeks.  She was also given information about mental health symptoms and treatment options . [facility] encouraged pt to continue exploring self-care options and intentionality. Pt is looking into [facility] D[facility] program. [facility] sent pt Mobile Actionhart message with education around mindful  eating. Pt is starting with individual therapy on 5/02/23 with Anastasia Serge within [place]. CD Recommendations: No indications of CD issues.      Individual Treatment Plan    Patient's Name: Poly Blanco  YOB: 1975    Date of Creation: 2/13/23  Date Treatment Plan Last Reviewed/Revised: n/a    DSM5 Diagnoses: 12/19/22  Psychosocial / Contextual Factors: partner's health   PROMIS (reviewed every 90 days): 12/19/22. Review due 3/19/23    Referral / Collaboration:  Pt is starting with individual therapy on 5/02/23 with Anastasia Valentine within [place]. Pt is currently exploring [facility] vs alternative option to address her binge eating disorder.     Anticipated number of session for this episode of care: bridge until first appointment with long-term therapist.   Anticipation frequency of session: Every other week  Anticipated Duration of each session: 38-52 minutes  Treatment plan will be reviewed in 90 days or when goals have been changed.       MeasurableTreatment Goal(s) related to diagnosis / functional impairment(s)  Goal 1: Patient will increase self-care activities along with a corresponding increase in positive emotion and life satisfaction.    Objective #A (Patient Action)    Patient will Increase interest, engagement, and pleasure in doing things.  Status: Continued - Date(s): 2/13/23    Intervention(s)  Therapist will help patient identify pleasant and mastery oriented events that elicit positive, relaxed mood.    Objective #B  Patient will Decrease frequency and intensity of feeling down, depressed, hopeless.  Status: Continued - Date(s): 2/13/23    Intervention(s)  Therapist will introduce patient to cognitive-behavioral and acceptance and commitment therapy topics aimed to help reduce depression and anxiety    Objective #C  Patient will Identify negative self-talk and behaviors: challenge core beliefs, myths, and actions  Improve concentration, focus, and mindfulness in daily  activities .  Status: Continued - Date(s): 2/13/23    Intervention(s)  Therapist will help patient identify and manage negative self-talk and automatic thoughts; introduce patient to cognitive distortions; help patient develop cognitive diffusion techniques        Other Possible Therapeutic Intervention(s):    Psycho-education regarding mental health diagnoses and treatment options    Supportive Therapy    Provide affirmations, reflections, and establish working rapport    Emphasize and reflect on strength of therapeutic relationship    Skills training    Explore skills useful to client in current situation.    Skills include assertiveness, communication, conflict management, problem-solving, relaxation, etc.    Solution-Focused Therapy    Explore patterns in patient's relationships and discuss options for new behaviors.    Explore patterns in patient's actions and choices and discuss options for new behaviors.    Cognitive-behavioral Therapy    Discuss common cognitive distortions, identify them in patient's life.    Explore ways to challenge, replace, and act against these cognitions.    Acceptance and Commitment Therapy    Explore and identify important values in patient's life.    Discuss ways to commit to behavioral activation around these values.    Psychodynamic psychotherapy    Discuss patient's emotional dynamics and issues and how they impact behaviors.    Explore patient's history of relationships and how they impact present behaviors.    Explore how to work with and make changes in these schemas and patterns.    Narrative Therapy    Explore the patient's story of his/her life from his/her perspective.    Explore alternate ways of understanding their experience, identifying exceptions, developing new themes.    Interpersonal Psychotherapy    Explore patterns in relationships that are effective or ineffective at helping patient reach their goals, find satisfying experience.    Discuss new patterns or  behaviors to engage in for improved social functioning.    Behavioral Activation    Discuss steps patient can take to become more involved in meaningful activity.    Identify barriers to these activities and explore possible solutions.    Mindfulness-Based Strategies    Discuss skills based on development and application of mindfulness.    Skills drawn from compassion-focused therapy, dialectical behavior therapy, mindfulness-based stress reduction, mindfulness-based cognitive therapy, etc.      Patient has reviewed and agreed to the above plan.        LUBA Salmeron, [facility]  Behavioral Health Clinician  [facility], [address], [address], [address], [place], [place], [address]  [phone]    2023

## 2023-03-20 NOTE — TELEPHONE ENCOUNTER
Prior Authorization Approval        Authorization Effective Date: 2/18/2023  Authorization Expiration Date: 7/18/2023  Medication: Saxenda-PA APPROVED   Approved Dose/Quantity:   Reference #:     Insurance Company: [employer] - Phone [phone] Fax [phone]  Expected CoPay:       CoPay Card Available:      Foundation Assistance Needed:    Which Pharmacy is filling the prescription (Not needed for infusion/clinic administered): [id number] - [address]  Pharmacy Notified: Yes- **Instructed pharmacy to notify patient when script is ready to /ship.**  Patient Notified: Yes

## 2023-03-20 NOTE — TELEPHONE ENCOUNTER
Central Prior Authorization Team   Phone: [phone]    PA Initiation    Medication: Saxenda  Insurance Company: [employer] - Phone [phone] Fax [phone]  Pharmacy Filling the Rx: [id number] - [facility] [address] [place] [address]  Filling Pharmacy Phone: [phone]  Filling Pharmacy Fax:    Start Date: 3/20/2023

## 2023-03-31 ENCOUNTER — VIRTUAL VISIT (OUTPATIENT)
Dept: BEHAVIORAL HEALTH | Facility: CLINIC | Age: 48
End: 2023-03-31
Payer: COMMERCIAL

## 2023-03-31 DIAGNOSIS — F50.810 BINGE-EATING DISORDER, MILD: Primary | ICD-10-CM

## 2023-03-31 PROCEDURE — 90834 PSYTX W PT 45 MINUTES: CPT | Mod: VID

## 2023-03-31 NOTE — PROGRESS NOTES
[facility] Primary Care: Integrated Behavioral Health     March 31st, 2023      Behavioral Health Clinician Progress Note    Patient Name: Poly Blanco           Service Type:  Individual      Service Location:   MyChart / Email (patient reached)     Session Start Time: 12:30 PM  Session End Time: 1:20PM      Session Length: 38 - 52      Attendees: Patient     Service Modality:  Video Visit:      Provider verified identity through the following two step process.  Patient provided:  Patient is known previously to provider    Telemedicine Visit: The patient's condition can be safely assessed and treated via synchronous audio and visual telemedicine encounter.      Reason for Telemedicine Visit: Patient has requested telehealth visit    Originating Site (Patient Location): Patient's home    Distant Site (Provider Location): [facility]    Consent:  The patient/guardian has verbally consented to: the potential risks and benefits of telemedicine (video visit) versus in person care; bill my insurance or make self-payment for services provided; and responsibility for payment of non-covered services.     Patient would like the video invitation sent by:  My Chart    Mode of Communication:  Video Conference via Amwell    Distant Location (Provider):  On-site    As the provider I attest to compliance with applicable laws and regulations related to telemedicine.    Visit Activities (Refresh list every visit): [facility] Only    Diagnostic Assessment Date: 12/19/22  Treatment Plan Review Date: 5/13/23  See Flowsheets for today's PHQ-9 and CIARAN-7 results  Previous PHQ-9: No flowsheet data found.  Previous CIARAN-7:   CIARAN-7 SCORE 12/19/2022   Total Score 4 (minimal anxiety)   Total Score 4       LANA LEVEL:  No flowsheet data found.    DATA  Extended Session (60+ minutes): No  Interactive Complexity: No  Crisis: No  [facility] Patient: No    Treatment Objective(s) Addressed in This Session:  Target  Behavior(s): disease management/lifestyle changes .    Reduce/address binge eating behaviors and increasing stress management.     Current Stressors / Issues:    Update: [facility] met with pt face to face in the clinic on this date. Pt states that she is trying to practice self-care. Pt reports that she continues to use her Coravin nadja to track her self-care efforts. Pt states that she is getting a massage and is also looking forward to getting a new tablet for reading purposes. Pt states that at this time, her sleep is poor. Pt reports that she has not been sleeping well at her home, but is sleeping better at her partner's house. Pt states that she is able to fall asleep, but is waking up in the middle of the night. Pt states that she has tried box breathing and mindfulness practices for sleep without success. [facility] and pt discussed sleep hygiene. Pt continues to discuss stress related to her caregiver role with her partner and her grief related to his cognitive decline. Pt acknowledges feeling hopeless at times, but states that she does not feel she is depressed. No SI/HI/SIB.     Progress on Treatment Objective(s) / Homework:  Satisfactory progress - ACTION (Actively working towards change); Intervened by reinforcing change plan / affirming steps taken    Motivational Interviewing    MI Intervention: Expressed Empathy/Understanding, Permission to raise concern or advise, Open-ended questions, Reflections: simple and complex and Change talk (evoked)     Change Talk Expressed by the Patient: Need to change Committment to change Activation    Provider Response to Change Talk: E - Evoked more info from patient about behavior change, A - Affirmed patient's thoughts, decisions, or attempts at behavior change, R - Reflected patient's change talk and S - Summarized patient's change talk statements     Solution-Focused Therapy    Explore patterns in patient's relationships and discuss options for new behaviors.    Explore patterns  in patient's actions and choices and discuss options for new behaviors.      Care Plan review completed: Yes    Medication Review:  No changes to current psychiatric medication(s)    Medication Compliance:  Yes    Changes in Health Issues:   Yes:Recently dx of Eosinophilic esophagitis. Currently established at the [facility] weight management clinic in [place].     Chemical Use Review:   Substance Use: Chemical use reviewed, no active concerns identified      Tobacco Use: No current tobacco use.      Assessment: Current Emotional / Mental Status (status of significant symptoms):  Risk status (Self / Other harm or suicidal ideation)  Patient has had a history of suicidal ideation: hx of passive SI  Patient denies current fears or concerns for personal safety.  Patient denies current or recent suicidal ideation or behaviors.  Patient denies current or recent homicidal ideation or behaviors.  Patient denies current or recent self injurious behavior or ideation.  Patient denies other safety concerns.  A safety and risk management plan has not been developed at this time, however patient was encouraged to call [facility] should there be a change in any of these risk factors.    Appearance:   Appropriate   Eye Contact:   Good   Psychomotor Behavior: Normal   Attitude:   Cooperative  Interested Pleasant  Orientation:   All  Speech   Rate / Production: Normal/ Responsive   Volume:  Normal   Mood:    Sad   Affect:    Tearful  Thought Content:  Clear   Thought Form:  Coherent  Logical   Insight:    Good     Diagnoses:  1. Binge-eating disorder, mild        Collateral Reports Completed:  Not Applicable    Plan: (Homework, other):  Patient was given information about behavioral services and encouraged to schedule a follow up appointment with the clinic [facility] in 2 weeks.  She was also given information about mental health symptoms and treatment options . [facility] encouraged pt to continue exploring self-care options  and intentionality. Pt is looking into [facility] DBT program.  Pt is starting with individual therapy on 5/02/23 with Anastasia Valentine within [facility]. At this time, pt states that she would like to transition to 30 minute appointments to see how they go. CD Recommendations: No indications of CD issues.      Individual Treatment Plan    Patient's Name: Poly Blanco  YOB: 1975    Date of Creation: 2/13/23  Date Treatment Plan Last Reviewed/Revised: n/a    DSM5 Diagnoses: 12/19/22  Psychosocial / Contextual Factors: partner's health   PROMIS (reviewed every 90 days): 12/19/22. Review due 3/19/23    Referral / Collaboration:  Pt is starting with individual therapy on 5/02/23 with Anastasia Valentine within [facility]. Pt is currently exploring [facility] vs alternative option to address her binge eating disorder.     Anticipated number of session for this episode of care: bridge until first appointment with long-term therapist.   Anticipation frequency of session: Every other week  Anticipated Duration of each session: 38-52 minutes  Treatment plan will be reviewed in 90 days or when goals have been changed.       MeasurableTreatment Goal(s) related to diagnosis / functional impairment(s)  Goal 1: Patient will increase self-care activities along with a corresponding increase in positive emotion and life satisfaction.    Objective #A (Patient Action)    Patient will Increase interest, engagement, and pleasure in doing things.  Status: Continued - Date(s): 2/13/23    Intervention(s)  Therapist will help patient identify pleasant and mastery oriented events that elicit positive, relaxed mood.    Objective #B  Patient will Decrease frequency and intensity of feeling down, depressed, hopeless.  Status: Continued - Date(s): 2/13/23    Intervention(s)  Therapist will introduce patient to cognitive-behavioral and acceptance and commitment therapy topics aimed to help reduce depression and  anxiety    Objective #C  Patient will Identify negative self-talk and behaviors: challenge core beliefs, myths, and actions  Improve concentration, focus, and mindfulness in daily activities .  Status: Continued - Date(s): 2/13/23    Intervention(s)  Therapist will help patient identify and manage negative self-talk and automatic thoughts; introduce patient to cognitive distortions; help patient develop cognitive diffusion techniques        Other Possible Therapeutic Intervention(s):    Psycho-education regarding mental health diagnoses and treatment options    Supportive Therapy    Provide affirmations, reflections, and establish working rapport    Emphasize and reflect on strength of therapeutic relationship    Skills training    Explore skills useful to client in current situation.    Skills include assertiveness, communication, conflict management, problem-solving, relaxation, etc.    Solution-Focused Therapy    Explore patterns in patient's relationships and discuss options for new behaviors.    Explore patterns in patient's actions and choices and discuss options for new behaviors.    Cognitive-behavioral Therapy    Discuss common cognitive distortions, identify them in patient's life.    Explore ways to challenge, replace, and act against these cognitions.    Acceptance and Commitment Therapy    Explore and identify important values in patient's life.    Discuss ways to commit to behavioral activation around these values.    Psychodynamic psychotherapy    Discuss patient's emotional dynamics and issues and how they impact behaviors.    Explore patient's history of relationships and how they impact present behaviors.    Explore how to work with and make changes in these schemas and patterns.    Narrative Therapy    Explore the patient's story of his/her life from his/her perspective.    Explore alternate ways of understanding their experience, identifying exceptions, developing new themes.    Interpersonal  Psychotherapy    Explore patterns in relationships that are effective or ineffective at helping patient reach their goals, find satisfying experience.    Discuss new patterns or behaviors to engage in for improved social functioning.    Behavioral Activation    Discuss steps patient can take to become more involved in meaningful activity.    Identify barriers to these activities and explore possible solutions.    Mindfulness-Based Strategies    Discuss skills based on development and application of mindfulness.    Skills drawn from compassion-focused therapy, dialectical behavior therapy, mindfulness-based stress reduction, mindfulness-based cognitive therapy, etc.      Patient has reviewed and agreed to the above plan.        LUBA Salmeron, [facility]  Behavioral Health Clinician  [facility] Professional [facility]ldg, [address], [address], [place], [place], [address]  [phone]    2023

## 2023-04-14 ENCOUNTER — VIRTUAL VISIT (OUTPATIENT)
Dept: BEHAVIORAL HEALTH | Facility: CLINIC | Age: 48
End: 2023-04-14
Payer: COMMERCIAL

## 2023-04-14 DIAGNOSIS — F50.810 BINGE-EATING DISORDER, MILD: Primary | ICD-10-CM

## 2023-04-14 PROCEDURE — 90832 PSYTX W PT 30 MINUTES: CPT | Mod: VID

## 2023-04-14 NOTE — PROGRESS NOTES
[facility]     April 14th, 2023       Behavioral Health Clinician Progress Note    Patient Name: Poly Blanco           Service Type:  Individual      Service Location:   MyChart / Email (patient reached)     Session Start Time: 2:30 PM  Session End Time: 2:58 PM      Session Length: 16 - 37      Attendees: Patient     Service Modality:  Video Visit:      Provider verified identity through the following two step process.  Patient provided:  Patient is known previously to provider    Telemedicine Visit: The patient's condition can be safely assessed and treated via synchronous audio and visual telemedicine encounter.      Reason for Telemedicine Visit: Patient has requested telehealth visit    Originating Site (Patient Location): Patient's place of employment    Distant Site (Provider Location): [facility]    Consent:  The patient/guardian has verbally consented to: the potential risks and benefits of telemedicine (video visit) versus in person care; bill my insurance or make self-payment for services provided; and responsibility for payment of non-covered services.     Patient would like the video invitation sent by:  My Chart    Mode of Communication:  Video Conference via Amwell    Distant Location (Provider):  On-site    As the provider I attest to compliance with applicable laws and regulations related to telemedicine.    Visit Activities (Refresh list every visit): [facility] Only    Diagnostic Assessment Date: 12/19/22  Treatment Plan Review Date: 5/13/23  See Flowsheets for today's PHQ-9 and CIARAN-7 results  Previous PHQ-9:        View : No data to display.              Previous CIARAN-7:       12/19/2022     8:49 AM   CIARAN-7 SCORE   Total Score 4 (minimal anxiety)   Total Score 4       LANA LEVEL:       View : No data to display.                DATA  Extended Session (60+ minutes): No  Interactive Complexity: No  Crisis: No  [facility]  Patient: No    Treatment Objective(s) Addressed in This Session:  Target Behavior(s): disease management/lifestyle changes .    Reduce/address binge eating behaviors and increasing stress management.     Current Stressors / Issues:    Update: C met with pt virtually on this date. [facility] and pt continue to discuss prioritizing self-care. Pt processed participating in caregiver support group and states that she found it helpful to be around other who know what it entails to be a caregiver. Pt discussed recent stress a work related to project. Pt states that her sleep has improved. Pt denies SI/HI/SIB.     Progress on Treatment Objective(s) / Homework:  Satisfactory progress - ACTION (Actively working towards change); Intervened by reinforcing change plan / affirming steps taken    Motivational Interviewing    MI Intervention: Expressed Empathy/Understanding, Permission to raise concern or advise, Open-ended questions, Reflections: simple and complex and Change talk (evoked)     Change Talk Expressed by the Patient: Need to change Committment to change Activation    Provider Response to Change Talk: E - Evoked more info from patient about behavior change, A - Affirmed patient's thoughts, decisions, or attempts at behavior change, R - Reflected patient's change talk and S - Summarized patient's change talk statements     Solution-Focused Therapy    Explore patterns in patient's relationships and discuss options for new behaviors.    Explore patterns in patient's actions and choices and discuss options for new behaviors.      Care Plan review completed: Yes    Medication Review:  No changes to current psychiatric medication(s)    Medication Compliance:  Yes    Changes in Health Issues:   Yes:Recently dx of Eosinophilic esophagitis. Currently established at the [facility] weight management clinic in [place].     Chemical Use Review:   Substance Use: Chemical use reviewed, no active concerns identified       Tobacco Use: No current tobacco use.      Assessment: Current Emotional / Mental Status (status of significant symptoms):  Risk status (Self / Other harm or suicidal ideation)  Patient has had a history of suicidal ideation: hx of passive SI  Patient denies current fears or concerns for personal safety.  Patient denies current or recent suicidal ideation or behaviors.  Patient denies current or recent homicidal ideation or behaviors.  Patient denies current or recent self injurious behavior or ideation.  Patient denies other safety concerns.  A safety and risk management plan has not been developed at this time, however patient was encouraged to call [place] [phone] should there be a change in any of these risk factors.    Appearance:   Appropriate   Eye Contact:   Good   Psychomotor Behavior: Normal   Attitude:   Cooperative  Interested Pleasant  Orientation:   All  Speech   Rate / Production: Normal/ Responsive   Volume:  Normal   Mood:    Euthymic  Affect:    Tearful at times  Thought Content:  Clear   Thought Form:  Coherent  Logical   Insight:    Good     Diagnoses:  1. Binge-eating disorder, mild        Collateral Reports Completed:  Not Applicable    Plan: (Homework, other):  Patient was given information about behavioral services and encouraged to schedule a follow up appointment with the clinic [facility] in 2 weeks.  She was also given information about mental health symptoms and treatment options . [facility] encouraged pt to continue exploring self-care options and intentionality. Pt is looking into [facility] DBT program.  Pt is starting with individual therapy on 5/02/23 with Anastasia Valentine within [facility]. At this time, pt states that she would like to transition to 30 minute appointments to see how they go. Pt did recently complete intake with [facility] to explore their DBT program for binge eating disorder. Pt is unsure if she will pursue this program due to concerns with  insurance coverage. CD Recommendations: No indications of CD issues.      Individual Treatment Plan    Patient's Name: Poly Blanco  YOB: 1975    Date of Creation: 2/13/23  Date Treatment Plan Last Reviewed/Revised: n/a    DSM5 Diagnoses: 12/19/22  Psychosocial / Contextual Factors: partner's health   PROMIS (reviewed every 90 days): 12/19/22. Review due 3/19/23    Referral / Collaboration:  Pt is starting with individual therapy on 5/02/23 with Anastasia Valentine within  [facility] .     Anticipated number of session for this episode of care: bridge until first appointment with long-term therapist.   Anticipation frequency of session: Every other week  Anticipated Duration of each session: 16-37 minutes  Treatment plan will be reviewed in 90 days or when goals have been changed.       MeasurableTreatment Goal(s) related to diagnosis / functional impairment(s)  Goal 1: Patient will increase self-care activities along with a corresponding increase in positive emotion and life satisfaction.    Objective #A (Patient Action)    Patient will Increase interest, engagement, and pleasure in doing things.  Status: Continued - Date(s): 2/13/23    Intervention(s)  Therapist will help patient identify pleasant and mastery oriented events that elicit positive, relaxed mood.    Objective #B  Patient will Decrease frequency and intensity of feeling down, depressed, hopeless.  Status: Continued - Date(s): 2/13/23    Intervention(s)  Therapist will introduce patient to cognitive-behavioral and acceptance and commitment therapy topics aimed to help reduce depression and anxiety    Objective #C  Patient will Identify negative self-talk and behaviors: challenge core beliefs, myths, and actions  Improve concentration, focus, and mindfulness in daily activities .  Status: Continued - Date(s): 2/13/23    Intervention(s)  Therapist will help patient identify and manage negative self-talk and automatic thoughts; introduce patient to  cognitive distortions; help patient develop cognitive diffusion techniques        Other Possible Therapeutic Intervention(s):    Psycho-education regarding mental health diagnoses and treatment options    Supportive Therapy    Provide affirmations, reflections, and establish working rapport    Emphasize and reflect on strength of therapeutic relationship    Skills training    Explore skills useful to client in current situation.    Skills include assertiveness, communication, conflict management, problem-solving, relaxation, etc.    Solution-Focused Therapy    Explore patterns in patient's relationships and discuss options for new behaviors.    Explore patterns in patient's actions and choices and discuss options for new behaviors.    Cognitive-behavioral Therapy    Discuss common cognitive distortions, identify them in patient's life.    Explore ways to challenge, replace, and act against these cognitions.    Acceptance and Commitment Therapy    Explore and identify important values in patient's life.    Discuss ways to commit to behavioral activation around these values.    Psychodynamic psychotherapy    Discuss patient's emotional dynamics and issues and how they impact behaviors.    Explore patient's history of relationships and how they impact present behaviors.    Explore how to work with and make changes in these schemas and patterns.    Narrative Therapy    Explore the patient's story of his/her life from his/her perspective.    Explore alternate ways of understanding their experience, identifying exceptions, developing new themes.    Interpersonal Psychotherapy    Explore patterns in relationships that are effective or ineffective at helping patient reach their goals, find satisfying experience.    Discuss new patterns or behaviors to engage in for improved social functioning.    Behavioral Activation    Discuss steps patient can take to become more involved in meaningful activity.    Identify barriers to  these activities and explore possible solutions.    Mindfulness-Based Strategies    Discuss skills based on development and application of mindfulness.    Skills drawn from compassion-focused therapy, dialectical behavior therapy, mindfulness-based stress reduction, mindfulness-based cognitive therapy, etc.      Patient has reviewed and agreed to the above plan.        LUBA Salmeron, [facility]  Behavioral Health Clinician  [facility] Professional [facility], [address], [address], [place], [address]  [phone]    2023

## 2023-05-08 DIAGNOSIS — E66.09 CLASS 1 OBESITY DUE TO EXCESS CALORIES WITHOUT SERIOUS COMORBIDITY WITH BODY MASS INDEX (BMI) OF 33.0 TO 33.9 IN ADULT: Primary | ICD-10-CM

## 2023-05-08 DIAGNOSIS — E66.811 CLASS 1 OBESITY DUE TO EXCESS CALORIES WITHOUT SERIOUS COMORBIDITY WITH BODY MASS INDEX (BMI) OF 33.0 TO 33.9 IN ADULT: Primary | ICD-10-CM

## 2023-05-16 ENCOUNTER — VIRTUAL VISIT (OUTPATIENT)
Dept: ENDOCRINOLOGY | Facility: CLINIC | Age: 48
End: 2023-05-16
Payer: COMMERCIAL

## 2023-05-16 VITALS — WEIGHT: 209 LBS | BODY MASS INDEX: 31.67 KG/M2 | HEIGHT: 68 IN

## 2023-05-16 DIAGNOSIS — E66.811 CLASS 1 OBESITY DUE TO EXCESS CALORIES WITHOUT SERIOUS COMORBIDITY WITH BODY MASS INDEX (BMI) OF 33.0 TO 33.9 IN ADULT: Primary | ICD-10-CM

## 2023-05-16 DIAGNOSIS — E66.09 CLASS 1 OBESITY DUE TO EXCESS CALORIES WITHOUT SERIOUS COMORBIDITY WITH BODY MASS INDEX (BMI) OF 33.0 TO 33.9 IN ADULT: Primary | ICD-10-CM

## 2023-05-16 PROCEDURE — 99213 OFFICE O/P EST LOW 20 MIN: CPT | Mod: VID | Performed by: INTERNAL MEDICINE

## 2023-05-16 NOTE — LETTER
2023       RE: Poly Blanco  [address]   [facility] [address]     Dear Colleague,    Thank you for referring your patient, Poly Blanco, to the [facility]. Please see a copy of my visit note below.          Return Medical Weight Management Note     Poly Blanco  MRN:  [id number]  :  1975  BERTRAM:  2023    Dear Yordy Parker MD,    I had the pleasure of seeing your patient Poly Blanco. She is a 47 year old female who I am continuing to see for treatment of obesity related to: hip pain, stress incontinence, eosinophilic esophagitis          2022     2:25 PM   --   I have the following health issues associated with obesity Stress Incontinence   I have the following symptoms associated with obesity Lower Extremity Swelling    Hip Pain         Binges is a problem when she is alone. She usually lost weight when she stayed with partner (lost 20-30 lbs)  Binges eating might be trigger by some stresses     INTERVAL HISTORY:  Last seen 2022.     Was initially on Saxenda and switched with Wegovy in 2023.    Started Wegovy for 3 months. Tolerated it well. Will be started on Wegovy 1.7 mg this upcoming week.  Wegovy helps to reduce food thought.    Lost     Exercise: walking outside, doing pilates    CURRENT WEIGHT:   209 lbs                      2023     4:04 PM   Changes and Difficulties   I have made the following changes to my diet since my last visit: I'm eating less overall! My only binges are PMS-related. It's amazing!   With regards to my diet, I am still struggling with: Sometimes portion control, sometimes eating enough - it depends on the day.   I have made the following changes to my activity/exercise since my last visit: I do Reformer Pilates 4 times a week and walk to work 3 times a week. I'm trying to get out for more walks now that the weather is better.    With regards to my activity/exercise, I am still struggling with: When I'm feeling overwhelmed with life, it's hard to make exercise a priority. The struggle is prioritizing myself and my needs.       VITALS:  There were no vitals taken for this visit.    MEDICATIONS:   Current Outpatient Medications   Medication Sig Dispense Refill    amphetamine-dextroamphetamine (ADDERALL XR) 15 MG 24 hr capsule Take 15 mg by mouth      buPROPion (WELLBUTRIN XL) 150 MG 24 hr tablet Take 150 mg by mouth every morning      insulin pen needle (31G X 5 MM) 31G X 5 MM miscellaneous Use 1 pen needle daily with Saxenda or as directed. 100 each 3    Multiple Vitamin (MULTIVITAMIN OR) Take  by mouth.        Multiple Vitamin (ONE-A-DAY ESSENTIAL) TABS Take 1 tablet by mouth daily      multivitamin w/minerals (MULTI-VITAMIN) tablet Take 1 tablet by mouth      omeprazole (PRILOSEC) 40 MG DR capsule       Semaglutide-Weight Management (WEGOVY) 1.7 MG/0.75ML pen Inject 1.7 mg Subcutaneous once a week 9 mL 1    tretinoin (RETIN-A) 0.025 % external cream       triamcinolone (KENALOG) 0.1 % external ointment              5/16/2023     4:04 PM   Weight Loss Medication History Reviewed With Patient   Which weight loss medications are you currently taking on a regular basis? Wegovy       ASSESSMENT:   Poly Blanco is a 47 year old female who I am continuing to see for treatment of obesity related to: hip pain, stress incontinence, eosinophilic esophagitis    Responded well to Wegovy injection without side effects  Lost 13 lbs in 5 months  Wegovy reduces food thought    PLAN:   Increase Wegovy to 1.7 mg weekly for 4 weeks   Then increase to 2.4 mg weekly thereafter    Continue to work on diet and exercise    FOLLOW-UP:    4 months.    Joined the call at 5/16/2023, 4:06:20 pm.  Left the call at 5/16/2023, 4:15:58 pm.  You were on the call for 9 minutes 38 seconds .    External notes/medical records independently reviewed, labs and imaging  independently reviewed, medical management and tests to be discussed/communicated to patient.    Time: I spent 22 minutes spent on the date of the encounter preparing to see patient (including chart review and preparation), obtaining and or reviewing additional medical history, performing a physical exam and evaluation, documenting clinical information in the electronic health record, independently interpreting results, communicating results to the patient and coordinating care.    Sincerely,    Sukh Coughlin MD

## 2023-05-16 NOTE — PROGRESS NOTES
Return Medical Weight Management Note     Poly Blanco  MRN:  [id number]  :  1975  BERTRAM:  2023    Dear Yordy Parker MD,    I had the pleasure of seeing your patient Poly Blanco. She is a 47 year old female who I am continuing to see for treatment of obesity related to: hip pain, stress incontinence, eosinophilic esophagitis          2022     2:25 PM   --   I have the following health issues associated with obesity Stress Incontinence   I have the following symptoms associated with obesity Lower Extremity Swelling    Hip Pain         Binges is a problem when she is alone. She usually lost weight when she stayed with partner (lost 20-30 lbs)  Binges eating might be trigger by some stresses     INTERVAL HISTORY:  Last seen 2022.     Was initially on Saxenda and switched with Wegovy in 2023.    Started Wegovy for 3 months. Tolerated it well. Will be started on Wegovy 1.7 mg this upcoming week.  Wegovy helps to reduce food thought.    Lost     Exercise: walking outside, doing pilates    CURRENT WEIGHT:   209 lbs                      2023     4:04 PM   Changes and Difficulties   I have made the following changes to my diet since my last visit: I'm eating less overall! My only binges are PMS-related. It's amazing!   With regards to my diet, I am still struggling with: Sometimes portion control, sometimes eating enough - it depends on the day.   I have made the following changes to my activity/exercise since my last visit: I do Reformer Pilates 4 times a week and walk to work 3 times a week. I'm trying to get out for more walks now that the weather is better.   With regards to my activity/exercise, I am still struggling with: When I'm feeling overwhelmed with life, it's hard to make exercise a priority. The struggle is prioritizing myself and my needs.       VITALS:  There were no vitals taken for this visit.    MEDICATIONS:   Current Outpatient Medications   Medication Sig Dispense  Refill     amphetamine-dextroamphetamine (ADDERALL XR) 15 MG 24 hr capsule Take 15 mg by mouth       buPROPion (WELLBUTRIN XL) 150 MG 24 hr tablet Take 150 mg by mouth every morning       insulin pen needle (31G X 5 MM) 31G X 5 MM miscellaneous Use 1 pen needle daily with Saxenda or as directed. 100 each 3     Multiple Vitamin (MULTIVITAMIN OR) Take  by mouth.         Multiple Vitamin (ONE-A-DAY ESSENTIAL) TABS Take 1 tablet by mouth daily       multivitamin w/minerals (MULTI-VITAMIN) tablet Take 1 tablet by mouth       omeprazole (PRILOSEC) 40 MG DR capsule        Semaglutide-Weight Management (WEGOVY) 1.7 MG/0.75ML pen Inject 1.7 mg Subcutaneous once a week 9 mL 1     tretinoin (RETIN-A) 0.025 % external cream        triamcinolone (KENALOG) 0.1 % external ointment              5/16/2023     4:04 PM   Weight Loss Medication History Reviewed With Patient   Which weight loss medications are you currently taking on a regular basis? Wegovy       ASSESSMENT:   Poly Blanco is a 47 year old female who I am continuing to see for treatment of obesity related to: hip pain, stress incontinence, eosinophilic esophagitis    Responded well to Wegovy injection without side effects  Lost 13 lbs in 5 months  Wegovy reduces food thought    PLAN:   Increase Wegovy to 1.7 mg weekly for 4 weeks   Then increase to 2.4 mg weekly thereafter    Continue to work on diet and exercise    FOLLOW-UP:    4 months.    Joined the call at 5/16/2023, 4:06:20 pm.  Left the call at 5/16/2023, 4:15:58 pm.  You were on the call for 9 minutes 38 seconds .    External notes/medical records independently reviewed, labs and imaging independently reviewed, medical management and tests to be discussed/communicated to patient.    Time: I spent 22 minutes spent on the date of the encounter preparing to see patient (including chart review and preparation), obtaining and or reviewing additional medical history, performing a physical exam and evaluation,  documenting clinical information in the electronic health record, independently interpreting results, communicating results to the patient and coordinating care.    Sincerely,    Sukh Coughlin MD

## 2023-05-16 NOTE — NURSING NOTE
"Chief Complaint   Patient presents with     Follow Up     Return weight management.         Vitals:    05/16/23 1652   Weight: 209 lb   Height: 5' 8\"       Body mass index is 31.78 kg/m .      Katy Torres, EMT  [facility] Clinic                      "

## 2023-05-16 NOTE — PROGRESS NOTES
Virtual Visit Check-In    Patient did not answer check in attempts x 3. Left voicemail x 3. Patient has completed questionnaire and has connected via [employer]. Provider informed.    Katy Torres NREMT      Video-Visit Details    Type of service:  Video Visit     Originating Location (pt. Location): Home    Distant Location (provider location):  Off-site  Platform used for Video Visit: Andres Lowery, EMT

## 2023-05-16 NOTE — PATIENT INSTRUCTIONS
Increase Wegovy to 1.7 mg weekly for 4 weeks   Then increase to 2.4 mg weekly thereafter    Return in 4 months      If you have any questions, please do not hesitate to call Weight management clinic at [phone] or [phone].  If you need to fax, please fax to [phone].    Sincerely,    Sukh Coughlin MD  Endocrinology

## 2023-05-22 ENCOUNTER — TELEPHONE (OUTPATIENT)
Dept: ENDOCRINOLOGY | Facility: CLINIC | Age: 48
End: 2023-05-22
Payer: COMMERCIAL

## 2023-07-09 ENCOUNTER — HEALTH MAINTENANCE LETTER (OUTPATIENT)
Age: 48
End: 2023-07-09

## 2023-08-25 DIAGNOSIS — E66.09 CLASS 1 OBESITY DUE TO EXCESS CALORIES WITHOUT SERIOUS COMORBIDITY WITH BODY MASS INDEX (BMI) OF 33.0 TO 33.9 IN ADULT: ICD-10-CM

## 2023-08-25 DIAGNOSIS — E66.811 CLASS 1 OBESITY DUE TO EXCESS CALORIES WITHOUT SERIOUS COMORBIDITY WITH BODY MASS INDEX (BMI) OF 33.0 TO 33.9 IN ADULT: ICD-10-CM

## 2023-08-29 RX ORDER — SEMAGLUTIDE 2.4 MG/.75ML
INJECTION, SOLUTION SUBCUTANEOUS
Qty: 3 ML | Refills: 1 | Status: SHIPPED | OUTPATIENT
Start: 2023-08-29 | End: 2023-09-05

## 2023-08-29 NOTE — TELEPHONE ENCOUNTER
Semaglutide-Weight Management (WEGOVY) 2.4 MG/0.75ML pen   3 mL 3 5/16/2023     Last Office Visit : 5-  Future Office visit:    9-5-2023    Labs completed on :   9-  CREATININE 0.57 - 1.11 mg/dL 0.74

## 2023-09-05 ENCOUNTER — VIRTUAL VISIT (OUTPATIENT)
Dept: ENDOCRINOLOGY | Facility: CLINIC | Age: 48
End: 2023-09-05
Payer: COMMERCIAL

## 2023-09-05 VITALS — HEIGHT: 68 IN | BODY MASS INDEX: 30.08 KG/M2 | WEIGHT: 198.5 LBS

## 2023-09-05 DIAGNOSIS — E66.811 CLASS 1 OBESITY DUE TO EXCESS CALORIES WITHOUT SERIOUS COMORBIDITY WITH BODY MASS INDEX (BMI) OF 33.0 TO 33.9 IN ADULT: ICD-10-CM

## 2023-09-05 DIAGNOSIS — E66.09 CLASS 1 OBESITY DUE TO EXCESS CALORIES WITHOUT SERIOUS COMORBIDITY WITH BODY MASS INDEX (BMI) OF 33.0 TO 33.9 IN ADULT: ICD-10-CM

## 2023-09-05 PROCEDURE — 99212 OFFICE O/P EST SF 10 MIN: CPT | Mod: VID | Performed by: INTERNAL MEDICINE

## 2023-09-05 RX ORDER — LISDEXAMFETAMINE DIMESYLATE 40 MG/1
40 CAPSULE ORAL EVERY MORNING
COMMUNITY
Start: 2023-07-16

## 2023-09-05 RX ORDER — SEMAGLUTIDE 2.4 MG/.75ML
INJECTION, SOLUTION SUBCUTANEOUS
Qty: 3 ML | Refills: 0 | Status: SHIPPED | OUTPATIENT
Start: 2023-09-05 | End: 2023-12-27

## 2023-09-05 ASSESSMENT — PAIN SCALES - GENERAL: PAINLEVEL: NO PAIN (0)

## 2023-09-05 NOTE — PATIENT INSTRUCTIONS
PLAN:   Continue Wegovy 2.4 mg weekly  Continue to work on diet and exercise    FOLLOW-UP:    Return in 3 months with PA and 6 months with MD    If you have any questions, please do not hesitate to call Weight management clinic at [phone] or [phone].  If you need to fax, please fax to [phone].    Sincerely,    Sukh Coughlin MD  Endocrinology

## 2023-09-05 NOTE — NURSING NOTE
Is the patient currently in the state of [place]? YES    Visit mode:VIDEO    If the visit is dropped, the patient can be reconnected by: VIDEO VISIT: Text to cell phone:   Telephone Information:   Mobile [phone]       Will anyone else be joining the visit? NO  (If patient encounters technical issues they should call [phone])    How would you like to obtain your AVS? MyChart    Are changes needed to the allergy or medication list? Yes Pt switched from Adderall to Vyvanse 40mg dose due to Adderall shortage    Reason for visit: AAKASH MACIELF

## 2023-09-05 NOTE — LETTER
2023       RE: Poly Blanco  [address]   [facility] [address]     Dear Colleague,    Thank you for referring your patient, Poly Blanco, to the [facility]. Please see a copy of my visit note below.      Return Medical Weight Management Note     Poly Blanco  MRN:  [id number]  :  1975  BERTRAM: 2023    Dear Yordy Parker MD,    I had the pleasure of seeing your patient Poly Blanco. She is a 47 year old female who I am continuing to see for treatment of obesity related to: hip pain, stress incontinence, eosinophilic esophagitis        2022     2:25 PM   --   I have the following health issues associated with obesity Stress Incontinence   I have the following symptoms associated with obesity Lower Extremity Swelling    Hip Pain      Binges is a problem when she is alone. She usually lost weight when she stayed with partner (lost 20-30 lbs)  Binges eating might be trigger by some stresses     INTERVAL HISTORY:  Last seen  2023    Was initially on Saxenda and switched with Wegovy in 2023.  Tolerated it well.   She is currently on Wegovy 2.4 mg weekly  Wegovy helps to cut down food thought.    Lost 23.5 lbs which is about 10% of TBW    Exercise: 30 minutes brisk walking and cardio, doing pilates  Would like to do more strengthen exercise but limited due to shoulder injury    CURRENT WEIGHT:   198 lbs    Initial Weight (lbs): 222 lbs  Last Visits Weight: 94.8 kg (209 lb)  Cumulative weight loss (lbs): 23.5  Weight Loss Percentage: 10.59%        2023     4:04 PM   Changes and Difficulties   I have made the following changes to my diet since my last visit: I'm eating less overall! My only binges are PMS-related. It's amazing!   With regards to my diet, I am still struggling with: Sometimes portion control, sometimes eating enough - it depends on the day.   I have made the  "following changes to my activity/exercise since my last visit: I do Reformer Pilates 4 times a week and walk to work 3 times a week. I'm trying to get out for more walks now that the weather is better.   With regards to my activity/exercise, I am still struggling with: When I'm feeling overwhelmed with life, it's hard to make exercise a priority. The struggle is prioritizing myself and my needs.     VITALS:  Ht 1.727 m (5' 8\")   Wt 90 kg (198 lb 8 oz)   BMI 30.18 kg/m      MEDICATIONS:   Current Outpatient Medications   Medication Sig Dispense Refill     insulin pen needle (31G X 5 MM) 31G X 5 MM miscellaneous Use 1 pen needle daily with Saxenda or as directed. 100 each 3     Multiple Vitamin (MULTIVITAMIN OR) Take  by mouth.         Multiple Vitamin (ONE-A-DAY ESSENTIAL) TABS Take 1 tablet by mouth daily       multivitamin w/minerals (MULTI-VITAMIN) tablet Take 1 tablet by mouth       omeprazole (PRILOSEC) 40 MG DR capsule        Semaglutide-Weight Management (WEGOVY) 1.7 MG/0.75ML pen Inject 1.7 mg Subcutaneous once a week 9 mL 1     Semaglutide-Weight Management (WEGOVY) 2.4 MG/0.75ML pen Inject 2.4 mg Subcutaneous once a week - Subcutaneous 3 mL 1     tretinoin (RETIN-A) 0.025 % external cream        triamcinolone (KENALOG) 0.1 % external ointment              5/16/2023     4:04 PM   Weight Loss Medication History Reviewed With Patient   Which weight loss medications are you currently taking on a regular basis? Wegovy   Are you having any side effects from the weight loss medication that we have prescribed you? No       ASSESSMENT:   Poly Blanco is a 47 year old female who I am continuing to see for treatment of obesity related to: hip pain, stress incontinence, eosinophilic esophagitis    Responded well to Wegovy injection without side effects  Lost 23 lbs in 6 months  Wegovy cuts down food thought  Would like to do more strengthening exercise    PLAN:   Continue Wegovy 2.4 mg weekly  Continue to work on diet " and exercise    FOLLOW-UP:    Return in 3 months with PA and 6 months with MD    Joined the call at 9/5/2023, 2:57:44 pm.  Left the call at 9/5/2023, 3:06:45 pm.  You were on the call for 9 minutes 1 second .      External notes/medical records independently reviewed, labs and imaging independently reviewed, medical management and te15ts to be discussed/communicated to patient.    Time: I spent  minutes spent on the date of the encounter preparing to see patient (including chart review and preparation), obtaining and or reviewing additional medical history, performing a physical exam and evaluation, documenting clinical information in the electronic health record, independently interpreting results, communicating results to the patient and coordinating care.    Sincerely,    Sukh Coughlin MD    Virtual Visit Details    Type of service:  Video Visit     Originating Location (pt. Location): Home    Distant Location (provider location):  Off-site  Platform used for Video Visit: Andres

## 2023-09-05 NOTE — PROGRESS NOTES
Virtual Visit Details    Type of service:  Video Visit     Originating Location (pt. Location): Home    Distant Location (provider location):  Off-site  Platform used for Video Visit: Andres

## 2023-09-05 NOTE — PROGRESS NOTES
Return Medical Weight Management Note     Poly Blanco  MRN:  [id number]  :  1975  BERTRAM: 2023    Dear Yordy Parker MD,    I had the pleasure of seeing your patient Poly Blanco. She is a 47 year old female who I am continuing to see for treatment of obesity related to: hip pain, stress incontinence, eosinophilic esophagitis        2022     2:25 PM   --   I have the following health issues associated with obesity Stress Incontinence   I have the following symptoms associated with obesity Lower Extremity Swelling    Hip Pain      Binges is a problem when she is alone. She usually lost weight when she stayed with partner (lost 20-30 lbs)  Binges eating might be trigger by some stresses     INTERVAL HISTORY:  Last seen  2023    Was initially on Saxenda and switched with Wegovy in 2023.  Tolerated it well.   She is currently on Wegovy 2.4 mg weekly  Wegovy helps to cut down food thought.    Lost 23.5 lbs which is about 10% of TBW    Exercise: 30 minutes brisk walking and cardio, doing pilates  Would like to do more strengthen exercise but limited due to shoulder injury    CURRENT WEIGHT:   198 lbs    Initial Weight (lbs): 222 lbs  Last Visits Weight: 94.8 kg (209 lb)  Cumulative weight loss (lbs): 23.5  Weight Loss Percentage: 10.59%        2023     4:04 PM   Changes and Difficulties   I have made the following changes to my diet since my last visit: I'm eating less overall! My only binges are PMS-related. It's amazing!   With regards to my diet, I am still struggling with: Sometimes portion control, sometimes eating enough - it depends on the day.   I have made the following changes to my activity/exercise since my last visit: I do Reformer Pilates 4 times a week and walk to work 3 times a week. I'm trying to get out for more walks now that the weather is better.   With regards to my activity/exercise, I am still struggling with: When I'm feeling overwhelmed with life, it's hard to  "make exercise a priority. The struggle is prioritizing myself and my needs.     VITALS:  Ht 1.727 m (5' 8\")   Wt 90 kg (198 lb 8 oz)   BMI 30.18 kg/m      MEDICATIONS:   Current Outpatient Medications   Medication Sig Dispense Refill    insulin pen needle (31G X 5 MM) 31G X 5 MM miscellaneous Use 1 pen needle daily with Saxenda or as directed. 100 each 3    Multiple Vitamin (MULTIVITAMIN OR) Take  by mouth.        Multiple Vitamin (ONE-A-DAY ESSENTIAL) TABS Take 1 tablet by mouth daily      multivitamin w/minerals (MULTI-VITAMIN) tablet Take 1 tablet by mouth      omeprazole (PRILOSEC) 40 MG DR capsule       Semaglutide-Weight Management (WEGOVY) 1.7 MG/0.75ML pen Inject 1.7 mg Subcutaneous once a week 9 mL 1    Semaglutide-Weight Management (WEGOVY) 2.4 MG/0.75ML pen Inject 2.4 mg Subcutaneous once a week - Subcutaneous 3 mL 1    tretinoin (RETIN-A) 0.025 % external cream       triamcinolone (KENALOG) 0.1 % external ointment              5/16/2023     4:04 PM   Weight Loss Medication History Reviewed With Patient   Which weight loss medications are you currently taking on a regular basis? Wegovy   Are you having any side effects from the weight loss medication that we have prescribed you? No       ASSESSMENT:   Poly Blanco is a 47 year old female who I am continuing to see for treatment of obesity related to: hip pain, stress incontinence, eosinophilic esophagitis    Responded well to Wegovy injection without side effects  Lost 23 lbs in 6 months  Wegovy cuts down food thought  Would like to do more strengthening exercise    PLAN:   Continue Wegovy 2.4 mg weekly  Continue to work on diet and exercise    FOLLOW-UP:    Return in 3 months with PA and 6 months with MD    Joined the call at 9/5/2023, 2:57:44 pm.  Left the call at 9/5/2023, 3:06:45 pm.  You were on the call for 9 minutes 1 second .      External notes/medical records independently reviewed, labs and imaging independently reviewed, medical management and " te15ts to be discussed/communicated to patient.    Time: I spent  minutes spent on the date of the encounter preparing to see patient (including chart review and preparation), obtaining and or reviewing additional medical history, performing a physical exam and evaluation, documenting clinical information in the electronic health record, independently interpreting results, communicating results to the patient and coordinating care.    Sincerely,    Sukh Coughlin MD

## 2023-12-09 DIAGNOSIS — E66.811 CLASS 1 OBESITY DUE TO EXCESS CALORIES WITHOUT SERIOUS COMORBIDITY WITH BODY MASS INDEX (BMI) OF 33.0 TO 33.9 IN ADULT: ICD-10-CM

## 2023-12-09 DIAGNOSIS — E66.09 CLASS 1 OBESITY DUE TO EXCESS CALORIES WITHOUT SERIOUS COMORBIDITY WITH BODY MASS INDEX (BMI) OF 33.0 TO 33.9 IN ADULT: ICD-10-CM

## 2023-12-14 RX ORDER — SEMAGLUTIDE 2.4 MG/.75ML
INJECTION, SOLUTION SUBCUTANEOUS
Qty: 3 ML | Refills: 0 | OUTPATIENT
Start: 2023-12-14

## 2023-12-14 NOTE — TELEPHONE ENCOUNTER
Semaglutide-Weight Management (WEGOVY) 2.4 MG/0.75ML pen 9 mL 3 9/5/2023       Semaglutide-Weight Management (WEGOVY) 2.4 MG/0.75ML pen 9 mL 3 9/5/2023  No   Sig - Route: Inject 2.4 mg Subcutaneous once a week - Subcutaneous   Sent to pharmacy as: Semaglutide-Weight Management 2.4 MG/0.75ML Subcutaneous Solution Auto-injector (WEGOVY)   Class: E-Prescribe   Order: [id number]   E-Prescribing Status: Receipt confirmed by pharmacy (9/5/2023  3:46 PM CDT)       Called and spoke to pharmacy.   Patient recently switched plans  Asked about above refills. They do have them on file.   They did say they need a new PA, the previous one ended in Oct 2023    Will route to clinic team and ask them to submit a new PA..  China Chavez RN

## 2023-12-15 NOTE — TELEPHONE ENCOUNTER
Per pharmacy liaison, test claim showed $0 copay. No PA is needed. Refills are on file through September 2024.

## 2023-12-27 ENCOUNTER — VIRTUAL VISIT (OUTPATIENT)
Dept: ENDOCRINOLOGY | Facility: CLINIC | Age: 48
End: 2023-12-27
Payer: COMMERCIAL

## 2023-12-27 ENCOUNTER — TELEPHONE (OUTPATIENT)
Dept: SURGERY | Facility: CLINIC | Age: 48
End: 2023-12-27

## 2023-12-27 VITALS — WEIGHT: 197 LBS | BODY MASS INDEX: 29.86 KG/M2 | HEIGHT: 68 IN

## 2023-12-27 DIAGNOSIS — E66.09 CLASS 1 OBESITY DUE TO EXCESS CALORIES WITHOUT SERIOUS COMORBIDITY WITH BODY MASS INDEX (BMI) OF 33.0 TO 33.9 IN ADULT: ICD-10-CM

## 2023-12-27 DIAGNOSIS — E66.811 CLASS 1 OBESITY DUE TO EXCESS CALORIES WITHOUT SERIOUS COMORBIDITY WITH BODY MASS INDEX (BMI) OF 33.0 TO 33.9 IN ADULT: ICD-10-CM

## 2023-12-27 PROCEDURE — 99202 OFFICE O/P NEW SF 15 MIN: CPT | Mod: VID | Performed by: PHYSICIAN ASSISTANT

## 2023-12-27 RX ORDER — SEMAGLUTIDE 2.4 MG/.75ML
INJECTION, SOLUTION SUBCUTANEOUS
Qty: 3 ML | Refills: 0 | Status: SHIPPED | OUTPATIENT
Start: 2023-12-27 | End: 2024-06-28

## 2023-12-27 ASSESSMENT — PAIN SCALES - GENERAL: PAINLEVEL: NO PAIN (0)

## 2023-12-27 NOTE — NURSING NOTE
Is the patient currently in the state of [place]? YES    Visit mode:VIDEO    If the visit is dropped, the patient can be reconnected by: VIDEO VISIT: Text to cell phone:   Telephone Information:   Mobile [phone]       Will anyone else be joining the visit? NO  (If patient encounters technical issues they should call [phone]    How would you like to obtain your AVS? MyChart    Are changes needed to the allergy or medication list? Pt stated no changes to allergies and Pt stated no med changes    Reason for visit: Follow Up    Elizabeth STEVENSON

## 2023-12-27 NOTE — TELEPHONE ENCOUNTER
Both Zepbound and Wegovy orders sent.   Zepbound not covered Wegovy  just filled 12-  What would provider like sent to pharmacy?    [Takes medication as prescribed] : takes [None] : Patient does not have any barriers to medication adherence

## 2023-12-27 NOTE — LETTER
2023       RE: Poly Blanco  [address] [facility] [address]     Dear Colleague,    Thank you for referring your patient, Poly Blanco, to the [facility]. Please see a copy of my visit note below.      Return Medical Weight Management Note     Poly Blanco  MRN:  [id number]  :  1975  BERTRAM:  2023    Dear Yordy Parker MD,    I had the pleasure of seeing your patient Poly Blanco. She is a 48 year old female who I am continuing to see for treatment of obesity related to:        2022     2:25 PM   --   I have the following health issues associated with obesity Stress Incontinence   I have the following symptoms associated with obesity Lower Extremity Swelling    Hip Pain       Assessment & Plan  Problem List Items Addressed This Visit    None  Visit Diagnoses       Class 1 obesity due to excess calories without serious comorbidity with body mass index (BMI) of 33.0 to 33.9 in adult               Weight mgmt follow up  Worked with Dr Luz, new to me  Taking Wegovy 2.4mg weekly  Has lost from 222 to 198 lbs  Weight stable since last visit  Interested in possibly switching to Zepbound for more effectiveness    Start Zepbound 7.5mg, continue Wegovy until approved.    Follow up MTM 6-8 weeks and Dr Luz 3/5/24    INTERVAL HISTORY:    Return MWM  Worked with Dr Luz, new to me  Taking Wegovy 2.4mg weekly  Has lost from 222 to 198 lbs  Weight stable since last visit  Interested in possibly switching to Zepbound for more effectiveness      Anti-obesity medication history    Current:   Wegovy 2.4mg    Past/Failed/contraindicated:   None    Recent diet changes: drinking 3 L of water per day and >60 g protein daily    Recent exercise/activity changes: Increased cardio over the last 6-8 weeks, pilates 3-4 x per week      CURRENT WEIGHT:   197 lbs 0 oz    Initial Weight (lbs): 230 lbs  (Simultaneous filing. User may not have seen previous data.)  Last Visits Weight: 90 kg (198 lb 8 oz)  Cumulative weight loss (lbs): 33 (Simultaneous filing. User may not have seen previous data.)  Weight Loss Percentage: 14.35% (Simultaneous filing. User may not have seen previous data.)        12/27/2023     2:23 PM   Changes and Difficulties   I have made the following changes to my diet since my last visit: I continue to be successful in avoiding binges.   With regards to my diet, I am still struggling with: Getting enough fiber   I have made the following changes to my activity/exercise since my last visit: Increased activity - over 10,000 steps and 60+ minutes of cardio on 22 days in December   With regards to my activity/exercise, I am still struggling with: Weight training / shoulder injury         MEDICATIONS:   Current Outpatient Medications   Medication Sig Dispense Refill    insulin pen needle (31G X 5 MM) 31G X 5 MM miscellaneous Use 1 pen needle daily with Saxenda or as directed. 100 each 3    lisdexamfetamine (VYVANSE) 40 MG capsule Take 40 mg by mouth every morning      Multiple Vitamin (MULTIVITAMIN OR) Take  by mouth.        Multiple Vitamin (ONE-A-DAY ESSENTIAL) TABS Take 1 tablet by mouth daily      multivitamin w/minerals (MULTI-VITAMIN) tablet Take 1 tablet by mouth      omeprazole (PRILOSEC) 40 MG DR capsule       Semaglutide-Weight Management (WEGOVY) 2.4 MG/0.75ML pen Inject 2.4 mg Subcutaneous once a week - Subcutaneous 3 mL 0    Semaglutide-Weight Management (WEGOVY) 2.4 MG/0.75ML pen Inject 2.4 mg Subcutaneous once a week 9 mL 3    tretinoin (RETIN-A) 0.025 % external cream       triamcinolone (KENALOG) 0.1 % external ointment              12/27/2023     2:23 PM   Weight Loss Medication History Reviewed With Patient   Which weight loss medications are you currently taking on a regular basis? Wegovy   Are you having any side effects from the weight loss medication that we have prescribed  you? No       Admission on 07/06/2012, Discharged on 07/06/2012   Component Date Value Ref Range Status    WBC 07/06/2012 5.5  4.0 - 11.0 10e9/L Final    RBC Count 07/06/2012 4.69  3.8 - 5.2 10e12/L Final    Hemoglobin 07/06/2012 14.0  11.7 - 15.7 g/dL Final    Hematocrit 07/06/2012 40.4  35.0 - 47.0 % Final    MCV 07/06/2012 86  78 - 100 fl Final    MCH 07/06/2012 29.9  26.5 - 33.0 pg Final    MCHC 07/06/2012 34.7  31.5 - 36.5 g/dL Final    RDW 07/06/2012 13.1  10.0 - 15.0 % Final    Platelet Count 07/06/2012 176  150 - 450 10e9/L Final    Diff Method 07/06/2012 Automated Method   Final    % Neutrophils 07/06/2012 85.8 (H)  40 - 75 % Final    % Lymphocytes 07/06/2012 9.4 (L)  20 - 48 % Final    % Monocytes 07/06/2012 4.0  0 - 12 % Final    % Eosinophils 07/06/2012 0.2  0 - 6 % Final    % Basophils 07/06/2012 0.2  0 - 2 % Final    % Immature Granulocytes 07/06/2012 0.4  0 - 0.4 % Final    Absolute Neutrophil 07/06/2012 4.7  1.6 - 8.3 10e9/L Final    Absolute Lymphocytes 07/06/2012 0.5 (L)  0.8 - 5.3 10e9/L Final    Absolute Monocytes 07/06/2012 0.2  0.0 - 1.3 10e9/L Final    Absolute Eosinophils 07/06/2012 0.0  0.0 - 0.7 10e9/L Final    Absolute Basophils 07/06/2012 0.0  0.0 - 0.2 10e9/L Final    Abs Immature Granulocytes 07/06/2012 0.0  0 - 0.03 10e9/L Final    Color Urine 07/06/2012 Straw   Final    Appearance Urine 07/06/2012 Clear   Final    Glucose Urine 07/06/2012 Negative  NEG mg/dL Final    Bilirubin Urine 07/06/2012 Negative  NEG Final    Ketones Urine 07/06/2012 Negative  NEG mg/dL Final    Specific Gravity Urine 07/06/2012 1.002 (L)  1.003 - 1.035 Final    Blood Urine 07/06/2012 Negative  NEG Final    pH Urine 07/06/2012 5.5  5.0 - 7.0 pH Final    Protein Albumin Urine 07/06/2012 Negative  NEG mg/dL Final    Urobilinogen mg/dL 07/06/2012 Normal  0.0 - 2.0 mg/dL Final    Nitrite Urine 07/06/2012 Negative  NEG Final    Leukocyte Esterase Urine 07/06/2012 Negative  NEG Final    Source 07/06/2012 Midstream  "Urine   Final    Bilirubin Conjugated 07/06/2012 0.0  0.0 - 0.3 mg/dL Final    Bilirubin Delta 07/06/2012 0.1  0.0 - 0.4 mg/dL Final    Bilirubin Total 07/06/2012 0.7  0.2 - 1.3 mg/dL Final    Albumin 07/06/2012 3.9  3.9 - 5.1 g/dL Final    Protein Total 07/06/2012 6.8  6.8 - 8.8 g/dL Final    Alkaline Phosphatase 07/06/2012 47  40 - 150 U/L Final    ALT 07/06/2012 7  0 - 50 U/L Final    AST 07/06/2012 24  0 - 45 U/L Final    Lipase 07/06/2012 68  20 - 250 U/L Final           PHYSICAL EXAM:  Objective   Ht 1.727 m (5' 7.99\")   Wt 89.4 kg (197 lb)   BMI 29.96 kg/m      Vitals - Patient Reported  Pain Score: No Pain (0)        GENERAL: Healthy, alert and no distress  EYES: Eyes grossly normal to inspection.  No discharge or erythema, or obvious scleral/conjunctival abnormalities.  RESP: No audible wheeze, cough, or visible cyanosis.  No visible retractions or increased work of breathing.    SKIN: Visible skin clear. No significant rash, abnormal pigmentation or lesions.  NEURO: Cranial nerves grossly intact.  Mentation and speech appropriate for age.  PSYCH: Mentation appears normal, affect normal/bright, judgement and insight intact, normal speech and appearance well-groomed.        Sincerely,    Sarahi Monroe PA-C      20 minutes spent by me on the date of the encounter doing chart review, history and exam, documentation and further activities per the note    "

## 2023-12-27 NOTE — TELEPHONE ENCOUNTER
PRIOR AUTHORIZATION DENIED    Medication: ZEPBOUND 7.5 MG/0.5ML SC SOAJ  Insurance Company: [employer]-Specialty PA's - Phone [phone] Fax [phone]  Denial Date: 12/27/2023  Denial Reason(s):   Appeal Information:   Patient Notified: clinic to discuss with pt what they would like to do

## 2023-12-27 NOTE — TELEPHONE ENCOUNTER
PA Initiation    Medication: ZEPBOUND 7.5 MG/0.5ML SC SOAJ  Insurance Company: [employer] Non-Specialty PA's - Phone [phone] Fax [phone]  Pharmacy Filling the Rx: [facility] - [place], [place] - [address] [place] [address]  Filling Pharmacy Phone: [phone]  Filling Pharmacy Fax: [phone]  Start Date: 12/27/2023

## 2023-12-27 NOTE — PROGRESS NOTES
Return Medical Weight Management Note     Poly Blanco  MRN:  [id number]  :  1975  BERTRAM:  2023    Dear Yordy Parker MD,    I had the pleasure of seeing your patient Poly Blanco. She is a 48 year old female who I am continuing to see for treatment of obesity related to:        2022     2:25 PM   --   I have the following health issues associated with obesity Stress Incontinence   I have the following symptoms associated with obesity Lower Extremity Swelling    Hip Pain       Assessment & Plan   Problem List Items Addressed This Visit    None  Visit Diagnoses       Class 1 obesity due to excess calories without serious comorbidity with body mass index (BMI) of 33.0 to 33.9 in adult               Weight mgmt follow up  Worked with Dr Luz, new to me  Taking Wegovy 2.4mg weekly  Has lost from 222 to 198 lbs  Weight stable since last visit  Interested in possibly switching to Zepbound for more effectiveness    Start Zepbound 7.5mg, continue Wegovy until approved.    Follow up MTM 6-8 weeks and Dr Luz 3/5/24    INTERVAL HISTORY:    Return MWM  Worked with Dr Luz, new to me  Taking Wegovy 2.4mg weekly  Has lost from 222 to 198 lbs  Weight stable since last visit  Interested in possibly switching to Zepbound for more effectiveness      Anti-obesity medication history    Current:   Wegovy 2.4mg    Past/Failed/contraindicated:   None    Recent diet changes: drinking 3 L of water per day and >60 g protein daily    Recent exercise/activity changes: Increased cardio over the last 6-8 weeks, pilates 3-4 x per week      CURRENT WEIGHT:   197 lbs 0 oz    Initial Weight (lbs): 230 lbs (Simultaneous filing. User may not have seen previous data.)  Last Visits Weight: 90 kg (198 lb 8 oz)  Cumulative weight loss (lbs): 33 (Simultaneous filing. User may not have seen previous data.)  Weight Loss Percentage: 14.35% (Simultaneous filing. User may not have seen previous data.)        2023     2:23 PM   Changes  and Difficulties   I have made the following changes to my diet since my last visit: I continue to be successful in avoiding binges.   With regards to my diet, I am still struggling with: Getting enough fiber   I have made the following changes to my activity/exercise since my last visit: Increased activity - over 10,000 steps and 60+ minutes of cardio on 22 days in December   With regards to my activity/exercise, I am still struggling with: Weight training / shoulder injury         MEDICATIONS:   Current Outpatient Medications   Medication Sig Dispense Refill    insulin pen needle (31G X 5 MM) 31G X 5 MM miscellaneous Use 1 pen needle daily with Saxenda or as directed. 100 each 3    lisdexamfetamine (VYVANSE) 40 MG capsule Take 40 mg by mouth every morning      Multiple Vitamin (MULTIVITAMIN OR) Take  by mouth.        Multiple Vitamin (ONE-A-DAY ESSENTIAL) TABS Take 1 tablet by mouth daily      multivitamin w/minerals (MULTI-VITAMIN) tablet Take 1 tablet by mouth      omeprazole (PRILOSEC) 40 MG DR capsule       Semaglutide-Weight Management (WEGOVY) 2.4 MG/0.75ML pen Inject 2.4 mg Subcutaneous once a week - Subcutaneous 3 mL 0    Semaglutide-Weight Management (WEGOVY) 2.4 MG/0.75ML pen Inject 2.4 mg Subcutaneous once a week 9 mL 3    tretinoin (RETIN-A) 0.025 % external cream       triamcinolone (KENALOG) 0.1 % external ointment              12/27/2023     2:23 PM   Weight Loss Medication History Reviewed With Patient   Which weight loss medications are you currently taking on a regular basis? Wegovy   Are you having any side effects from the weight loss medication that we have prescribed you? No       Admission on 07/06/2012, Discharged on 07/06/2012   Component Date Value Ref Range Status    WBC 07/06/2012 5.5  4.0 - 11.0 10e9/L Final    RBC Count 07/06/2012 4.69  3.8 - 5.2 10e12/L Final    Hemoglobin 07/06/2012 14.0  11.7 - 15.7 g/dL Final    Hematocrit 07/06/2012 40.4  35.0 - 47.0 % Final    MCV 07/06/2012 86   78 - 100 fl Final    MCH 07/06/2012 29.9  26.5 - 33.0 pg Final    MCHC 07/06/2012 34.7  31.5 - 36.5 g/dL Final    RDW 07/06/2012 13.1  10.0 - 15.0 % Final    Platelet Count 07/06/2012 176  150 - 450 10e9/L Final    Diff Method 07/06/2012 Automated Method   Final    % Neutrophils 07/06/2012 85.8 (H)  40 - 75 % Final    % Lymphocytes 07/06/2012 9.4 (L)  20 - 48 % Final    % Monocytes 07/06/2012 4.0  0 - 12 % Final    % Eosinophils 07/06/2012 0.2  0 - 6 % Final    % Basophils 07/06/2012 0.2  0 - 2 % Final    % Immature Granulocytes 07/06/2012 0.4  0 - 0.4 % Final    Absolute Neutrophil 07/06/2012 4.7  1.6 - 8.3 10e9/L Final    Absolute Lymphocytes 07/06/2012 0.5 (L)  0.8 - 5.3 10e9/L Final    Absolute Monocytes 07/06/2012 0.2  0.0 - 1.3 10e9/L Final    Absolute Eosinophils 07/06/2012 0.0  0.0 - 0.7 10e9/L Final    Absolute Basophils 07/06/2012 0.0  0.0 - 0.2 10e9/L Final    Abs Immature Granulocytes 07/06/2012 0.0  0 - 0.03 10e9/L Final    Color Urine 07/06/2012 Straw   Final    Appearance Urine 07/06/2012 Clear   Final    Glucose Urine 07/06/2012 Negative  NEG mg/dL Final    Bilirubin Urine 07/06/2012 Negative  NEG Final    Ketones Urine 07/06/2012 Negative  NEG mg/dL Final    Specific Gravity Urine 07/06/2012 1.002 (L)  1.003 - 1.035 Final    Blood Urine 07/06/2012 Negative  NEG Final    pH Urine 07/06/2012 5.5  5.0 - 7.0 pH Final    Protein Albumin Urine 07/06/2012 Negative  NEG mg/dL Final    Urobilinogen mg/dL 07/06/2012 Normal  0.0 - 2.0 mg/dL Final    Nitrite Urine 07/06/2012 Negative  NEG Final    Leukocyte Esterase Urine 07/06/2012 Negative  NEG Final    Source 07/06/2012 Midstream Urine   Final    Bilirubin Conjugated 07/06/2012 0.0  0.0 - 0.3 mg/dL Final    Bilirubin Delta 07/06/2012 0.1  0.0 - 0.4 mg/dL Final    Bilirubin Total 07/06/2012 0.7  0.2 - 1.3 mg/dL Final    Albumin 07/06/2012 3.9  3.9 - 5.1 g/dL Final    Protein Total 07/06/2012 6.8  6.8 - 8.8 g/dL Final    Alkaline Phosphatase 07/06/2012 47  40  "- 150 U/L Final    ALT 07/06/2012 7  0 - 50 U/L Final    AST 07/06/2012 24  0 - 45 U/L Final    Lipase 07/06/2012 68  20 - 250 U/L Final           PHYSICAL EXAM:  Objective    Ht 1.727 m (5' 7.99\")   Wt 89.4 kg (197 lb)   BMI 29.96 kg/m      Vitals - Patient Reported  Pain Score: No Pain (0)        GENERAL: Healthy, alert and no distress  EYES: Eyes grossly normal to inspection.  No discharge or erythema, or obvious scleral/conjunctival abnormalities.  RESP: No audible wheeze, cough, or visible cyanosis.  No visible retractions or increased work of breathing.    SKIN: Visible skin clear. No significant rash, abnormal pigmentation or lesions.  NEURO: Cranial nerves grossly intact.  Mentation and speech appropriate for age.  PSYCH: Mentation appears normal, affect normal/bright, judgement and insight intact, normal speech and appearance well-groomed.        Sincerely,    Sarahi Monroe PA-C      20 minutes spent by me on the date of the encounter doing chart review, history and exam, documentation and further activities per the note  "

## 2024-03-05 ENCOUNTER — TELEPHONE (OUTPATIENT)
Dept: ENDOCRINOLOGY | Facility: CLINIC | Age: 49
End: 2024-03-05

## 2024-03-05 ENCOUNTER — VIRTUAL VISIT (OUTPATIENT)
Dept: ENDOCRINOLOGY | Facility: CLINIC | Age: 49
End: 2024-03-05
Payer: COMMERCIAL

## 2024-03-05 VITALS — HEIGHT: 68 IN | WEIGHT: 199.1 LBS | BODY MASS INDEX: 30.17 KG/M2

## 2024-03-05 DIAGNOSIS — E66.09 CLASS 1 OBESITY DUE TO EXCESS CALORIES WITHOUT SERIOUS COMORBIDITY WITH BODY MASS INDEX (BMI) OF 30.0 TO 30.9 IN ADULT: Primary | ICD-10-CM

## 2024-03-05 DIAGNOSIS — E66.811 CLASS 1 OBESITY DUE TO EXCESS CALORIES WITHOUT SERIOUS COMORBIDITY WITH BODY MASS INDEX (BMI) OF 30.0 TO 30.9 IN ADULT: Primary | ICD-10-CM

## 2024-03-05 PROCEDURE — 99212 OFFICE O/P EST SF 10 MIN: CPT | Mod: 95 | Performed by: INTERNAL MEDICINE

## 2024-03-05 ASSESSMENT — PAIN SCALES - GENERAL: PAINLEVEL: NO PAIN (0)

## 2024-03-05 NOTE — PROGRESS NOTES
"  Return Medical Weight Management Note     Poly Blanco  MRN:  [id number]  :  1975  BERTRAM:3/5/2024    Dear Yordy Parker MD,    I had the pleasure of seeing your patient Poly Blanco. She is a 48 year old female who I am continuing to see for treatment of obesity related to: hip pain, stress incontinence, eosinophilic esophagitis        2022     2:25 PM   --   I have the following health issues associated with obesity Stress Incontinence   I have the following symptoms associated with obesity Lower Extremity Swelling    Hip Pain      Binges is a problem when she is alone. She usually lost weight when she stayed with partner (lost 20-30 lbs)  Binges eating might be trigger by some stresses     INTERVAL HISTORY:  Last seen by Sarahi Monroe Dec 2023.    Was initially on Saxenda and switched with Wegovy in 2023.  She is currently on Wegovy 2.4 mg weekly.  Wegovy helps to cut down food thought and binges eating   Lost 30 lbs which is about 13% of TBW.Weight has been plateau.   Would like to try Zepbound.    Exercise: palates and walking    CURRENT WEIGHT:   198 lbs    Initial Weight (lbs): 230 lbs  Last Visits Weight: 89.4 kg (197 lb)  Cumulative weight loss (lbs): 30.9  Weight Loss Percentage: 13.43%        2023     2:23 PM   Changes and Difficulties   I have made the following changes to my diet since my last visit: I continue to be successful in avoiding binges.   With regards to my diet, I am still struggling with: Getting enough fiber   I have made the following changes to my activity/exercise since my last visit: Increased activity - over 10,000 steps and 60+ minutes of cardio on 22 days in December   With regards to my activity/exercise, I am still struggling with: Weight training / shoulder injury     VITALS:  Ht 1.727 m (5' 7.99\")   Wt 90.3 kg (199 lb 1.6 oz)   BMI 30.28 kg/m      MEDICATIONS:   Current Outpatient Medications   Medication Sig Dispense Refill    insulin pen needle (31G X " 5 MM) 31G X 5 MM miscellaneous Use 1 pen needle daily with Saxenda or as directed. 100 each 3    lisdexamfetamine (VYVANSE) 40 MG capsule Take 40 mg by mouth every morning      Multiple Vitamin (MULTIVITAMIN OR) Take  by mouth.        Multiple Vitamin (ONE-A-DAY ESSENTIAL) TABS Take 1 tablet by mouth daily      multivitamin w/minerals (MULTI-VITAMIN) tablet Take 1 tablet by mouth      omeprazole (PRILOSEC) 40 MG DR capsule       Semaglutide-Weight Management (WEGOVY) 2.4 MG/0.75ML pen Inject 2.4 mg Subcutaneous once a week - Subcutaneous 3 mL 0    tretinoin (RETIN-A) 0.025 % external cream       triamcinolone (KENALOG) 0.1 % external ointment       Semaglutide-Weight Management (WEGOVY) 2.4 MG/0.75ML pen Inject 2.4 mg Subcutaneous once a week 9 mL 3           3/5/2024     9:44 AM   Weight Loss Medication History Reviewed With Patient   Are you having any side effects from the weight loss medication that we have prescribed you? No       ASSESSMENT:   Poly Blanco is a 47 year old female who I am continuing to see for treatment of obesity related to: hip pain, stress incontinence, eosinophilic esophagitis    Responded well to Wegovy injection without side effects  Lost 30 lbs in 9- months  Wegovy cuts down food thought  Weight is plateau now    PLAN:   Patient would like to try appeal Mounjaro  Continue Wegovy 2.4 mg weekly for now  Continue to work on diet and exercise    FOLLOW-UP:    Return in 4 months with PA and 8 months with MD    Joined the call at 3/5/2024, 10:03:47 am.  Left the call at 3/5/2024, 10:10:41 am.  You were on the call for 6 minutes 54 seconds .    External notes/medical records independently reviewed, labs and imaging independently reviewed, medical management and tests to be discussed/communicated to patient.    Time: I spent  15 minutes spent on the date of the encounter preparing to see patient (including chart review and preparation), obtaining and or reviewing additional medical history,  performing a physical exam and evaluation, documenting clinical information in the electronic health record, independently interpreting results, communicating results to the patient and coordinating care.    Sincerely,    Sukh Coughlin MD

## 2024-03-05 NOTE — PATIENT INSTRUCTIONS
Patient would like to try Mounjaro    For now --  Continue Wegovy 2.4 mg weekly for now  Continue to work on diet and exercise     FOLLOW-UP:    Return in 4 months with PA and 8 months with MD    If you have any questions, please do not hesitate to call Weight management clinic at [phone] or [phone].  If you need to fax, please fax to [phone].    Sincerely,    Sukh Coughlin MD  Endocrinology

## 2024-03-05 NOTE — TELEPHONE ENCOUNTER
Prior Authorization Approval    Medication: ZEPBOUND 5 MG/0.5ML SC SOAJ  Authorization Effective Date: 2/4/2024  Authorization Expiration Date: 10/31/2024  Approved Dose/Quantity: 1 month  Reference #: CMM [id number]   Insurance Company: [employer] ([employer]) - Phone [phone] Fax [phone]  Expected CoPay: $    CoPay Card Available:      Financial Assistance Needed: n/a  Which Pharmacy is filling the prescription: [id number] - [place], [place] - [address] [place] [address]  Pharmacy Notified: order sent to pharmacy  Patient Notified: Aditive message sent to patient

## 2024-03-05 NOTE — TELEPHONE ENCOUNTER
PA Initiation    Medication: ZEPBOUND 5 MG/0.5ML SC SOAJ  Insurance Company: [employer] ([employer]) - Phone [phone] Fax [phone]  Pharmacy Filling the Rx: [id number] - [address]  Filling Pharmacy Phone: [phone]  Filling Pharmacy Fax: [phone]  Start Date: 3/5/2024

## 2024-03-05 NOTE — TELEPHONE ENCOUNTER
PA Approved for [facility]. Order sent to pharmacy and ([employer] message sent to patient. Closing encounter

## 2024-03-05 NOTE — LETTER
3/5/2024       RE: Poly Blanco  [address] [address]     Dear Colleague,    Thank you for referring your patient, Poly Blanco, to the [facility]. Please see a copy of my visit note below.      Return Medical Weight Management Note     Poly Blanco  MRN:  [id number]  :  1975  BERTRAM:3/5/2024    Dear Yordy Parker MD,    I had the pleasure of seeing your patient Poly Blanco. She is a 48 year old female who I am continuing to see for treatment of obesity related to: hip pain, stress incontinence, eosinophilic esophagitis        2022     2:25 PM   --   I have the following health issues associated with obesity Stress Incontinence   I have the following symptoms associated with obesity Lower Extremity Swelling    Hip Pain      Binges is a problem when she is alone. She usually lost weight when she stayed with partner (lost 20-30 lbs)  Binges eating might be trigger by some stresses     INTERVAL HISTORY:  Last seen by Sarahi Monroe Dec 2023.    Was initially on Saxenda and switched with Wegovy in 2023.  She is currently on Wegovy 2.4 mg weekly.  Wegovy helps to cut down food thought and binges eating   Lost 30 lbs which is about 13% of TBW.Weight has been plateau.   Would like to try Zepbound.    Exercise: palates and walking    CURRENT WEIGHT:   198 lbs    Initial Weight (lbs): 230 lbs  Last Visits Weight: 89.4 kg (197 lb)  Cumulative weight loss (lbs): 30.9  Weight Loss Percentage: 13.43%        2023     2:23 PM   Changes and Difficulties   I have made the following changes to my diet since my last visit: I continue to be successful in avoiding binges.   With regards to my diet, I am still struggling with: Getting enough fiber   I have made the following changes to my activity/exercise since my last visit: Increased activity - over 10,000 steps and 60+ minutes of cardio  "on 22 days in December   With regards to my activity/exercise, I am still struggling with: Weight training / shoulder injury     VITALS:  Ht 1.727 m (5' 7.99\")   Wt 90.3 kg (199 lb 1.6 oz)   BMI 30.28 kg/m      MEDICATIONS:   Current Outpatient Medications   Medication Sig Dispense Refill    insulin pen needle (31G X 5 MM) 31G X 5 MM miscellaneous Use 1 pen needle daily with Saxenda or as directed. 100 each 3    lisdexamfetamine (VYVANSE) 40 MG capsule Take 40 mg by mouth every morning      Multiple Vitamin (MULTIVITAMIN OR) Take  by mouth.        Multiple Vitamin (ONE-A-DAY ESSENTIAL) TABS Take 1 tablet by mouth daily      multivitamin w/minerals (MULTI-VITAMIN) tablet Take 1 tablet by mouth      omeprazole (PRILOSEC) 40 MG DR capsule       Semaglutide-Weight Management (WEGOVY) 2.4 MG/0.75ML pen Inject 2.4 mg Subcutaneous once a week - Subcutaneous 3 mL 0    tretinoin (RETIN-A) 0.025 % external cream       triamcinolone (KENALOG) 0.1 % external ointment       Semaglutide-Weight Management (WEGOVY) 2.4 MG/0.75ML pen Inject 2.4 mg Subcutaneous once a week 9 mL 3           3/5/2024     9:44 AM   Weight Loss Medication History Reviewed With Patient   Are you having any side effects from the weight loss medication that we have prescribed you? No       ASSESSMENT:   Poly Blanco is a 47 year old female who I am continuing to see for treatment of obesity related to: hip pain, stress incontinence, eosinophilic esophagitis    Responded well to Wegovy injection without side effects  Lost 30 lbs in 9- months  Wegovy cuts down food thought  Weight is plateau now    PLAN:   Patient would like to try appeal Mounjaro  Continue Wegovy 2.4 mg weekly for now  Continue to work on diet and exercise    FOLLOW-UP:    Return in 4 months with PA and 8 months with MD    Joined the call at 3/5/2024, 10:03:47 am.  Left the call at 3/5/2024, 10:10:41 am.  You were on the call for 6 minutes 54 seconds .    External notes/medical records " independently reviewed, labs and imaging independently reviewed, medical management and tests to be discussed/communicated to patient.    Time: I spent  15 minutes spent on the date of the encounter preparing to see patient (including chart review and preparation), obtaining and or reviewing additional medical history, performing a physical exam and evaluation, documenting clinical information in the electronic health record, independently interpreting results, communicating results to the patient and coordinating care.    Sincerely,    Sukh Coughlin MD

## 2024-03-05 NOTE — NURSING NOTE
Is the patient currently in the state of [place]? YES    Visit mode:VIDEO    If the visit is dropped, the patient can be reconnected by: VIDEO VISIT: Text to cell phone:   Telephone Information:   Mobile [phone]       Will anyone else be joining the visit? NO  (If patient encounters technical issues they should call [phone]    How would you like to obtain your AVS? MyChart    Are changes needed to the allergy or medication list? Yes Pt stated she removed a Wegovy duplicate, also stated taking 50mg of Vyvanse and Pt stated no changes to allergies    Reason for visit: RECHECK (CATRACHITO)    Sumi STEVENSON

## 2024-03-07 ENCOUNTER — TELEPHONE (OUTPATIENT)
Dept: ENDOCRINOLOGY | Facility: CLINIC | Age: 49
End: 2024-03-07
Payer: COMMERCIAL

## 2024-03-07 NOTE — TELEPHONE ENCOUNTER
Left Voicemail (1st Attempt) and Sent Mychart (1st Attempt) for the patient to call back and schedule the following:    Appointment type: [facility]   Provider: Dr. Luz   Return date:  8 mos ( 11/05/24 )   Specialty phone number: [phone]  Additional appointment(s) needed: Yes   Additonal Notes: KAYLENE MTZ, 4 mos ( 07/05/2024 )

## 2024-04-01 ENCOUNTER — TELEPHONE (OUTPATIENT)
Dept: ENDOCRINOLOGY | Facility: CLINIC | Age: 49
End: 2024-04-01
Payer: COMMERCIAL

## 2024-04-01 NOTE — TELEPHONE ENCOUNTER
Patient confirmed scheduled appointment:  Date: 7/9/24  Time: 3:30 pm  Visit type: RET MWM  Provider: Jennifer Kovacs  Location: virtual  Testing/imaging: n/a  Additional notes: n/a

## 2024-04-01 NOTE — TELEPHONE ENCOUNTER
Patient confirmed scheduled appointment:  Date: 11/19/24  Time: 4:15 pm  Visit type: [facility]  Provider: Dr Luz  Location: virtual  Testing/imaging: n/a  Additional notes: n/a

## 2024-04-19 ENCOUNTER — TELEPHONE (OUTPATIENT)
Dept: ENDOCRINOLOGY | Facility: CLINIC | Age: 49
End: 2024-04-19
Payer: COMMERCIAL

## 2024-04-19 NOTE — TELEPHONE ENCOUNTER
Left Voicemail (1st Attempt) and Sent Mychart (1st Attempt) for the patient to call back and schedule the following:    Appointment type: [facility]  Provider: Deborah Kovacs PA-C  Return date: 07/09/2024  Specialty phone number: [phone]  Additional appointment(s) needed: no   Additonal Notes: Provider unavailable, pt needs to reschedule

## 2024-04-29 ENCOUNTER — TELEPHONE (OUTPATIENT)
Dept: ENDOCRINOLOGY | Facility: CLINIC | Age: 49
End: 2024-04-29
Payer: COMMERCIAL

## 2024-04-29 NOTE — TELEPHONE ENCOUNTER
Left Voicemail (2nd Attempt) and Sent Mychart (2nd Attempt) for the patient to call back and schedule the following:    Appointment type: [facility]  Provider: Jennifer Kovacs  Return date: Reschedule 7/9/24 appointment as provider no longer available  Specialty phone number: [phone]  Additional appointment(s) needed: n/a  Additonal Notes: n/a

## 2024-06-28 DIAGNOSIS — E66.09 CLASS 1 OBESITY DUE TO EXCESS CALORIES WITHOUT SERIOUS COMORBIDITY WITH BODY MASS INDEX (BMI) OF 30.0 TO 30.9 IN ADULT: ICD-10-CM

## 2024-06-28 DIAGNOSIS — E66.811 CLASS 1 OBESITY DUE TO EXCESS CALORIES WITHOUT SERIOUS COMORBIDITY WITH BODY MASS INDEX (BMI) OF 30.0 TO 30.9 IN ADULT: ICD-10-CM

## 2024-06-28 DIAGNOSIS — E66.811 CLASS 1 OBESITY DUE TO EXCESS CALORIES WITHOUT SERIOUS COMORBIDITY WITH BODY MASS INDEX (BMI) OF 30.0 TO 30.9 IN ADULT: Primary | ICD-10-CM

## 2024-06-28 DIAGNOSIS — E66.09 CLASS 1 OBESITY DUE TO EXCESS CALORIES WITHOUT SERIOUS COMORBIDITY WITH BODY MASS INDEX (BMI) OF 30.0 TO 30.9 IN ADULT: Primary | ICD-10-CM

## 2024-09-03 DIAGNOSIS — E66.09 CLASS 1 OBESITY DUE TO EXCESS CALORIES WITHOUT SERIOUS COMORBIDITY WITH BODY MASS INDEX (BMI) OF 33.0 TO 33.9 IN ADULT: Primary | ICD-10-CM

## 2024-09-03 DIAGNOSIS — E66.811 CLASS 1 OBESITY DUE TO EXCESS CALORIES WITHOUT SERIOUS COMORBIDITY WITH BODY MASS INDEX (BMI) OF 33.0 TO 33.9 IN ADULT: Primary | ICD-10-CM

## 2024-09-05 DIAGNOSIS — E66.09 CLASS 1 OBESITY DUE TO EXCESS CALORIES WITHOUT SERIOUS COMORBIDITY WITH BODY MASS INDEX (BMI) OF 33.0 TO 33.9 IN ADULT: ICD-10-CM

## 2024-09-05 DIAGNOSIS — E66.811 CLASS 1 OBESITY DUE TO EXCESS CALORIES WITHOUT SERIOUS COMORBIDITY WITH BODY MASS INDEX (BMI) OF 33.0 TO 33.9 IN ADULT: ICD-10-CM

## 2024-09-26 ENCOUNTER — VIRTUAL VISIT (OUTPATIENT)
Dept: ENDOCRINOLOGY | Facility: CLINIC | Age: 49
End: 2024-09-26
Payer: COMMERCIAL

## 2024-09-26 VITALS — HEIGHT: 68 IN | WEIGHT: 195 LBS | BODY MASS INDEX: 29.55 KG/M2

## 2024-09-26 DIAGNOSIS — E66.09 CLASS 1 OBESITY DUE TO EXCESS CALORIES WITHOUT SERIOUS COMORBIDITY WITH BODY MASS INDEX (BMI) OF 33.0 TO 33.9 IN ADULT: ICD-10-CM

## 2024-09-26 DIAGNOSIS — E66.811 CLASS 1 OBESITY DUE TO EXCESS CALORIES WITHOUT SERIOUS COMORBIDITY WITH BODY MASS INDEX (BMI) OF 33.0 TO 33.9 IN ADULT: ICD-10-CM

## 2024-09-26 PROBLEM — L30.9 DERMATITIS: Status: ACTIVE | Noted: 2017-12-06

## 2024-09-26 PROBLEM — M25.522 LEFT ELBOW PAIN: Status: ACTIVE | Noted: 2022-02-02

## 2024-09-26 PROBLEM — M79.89 LEFT LEG SWELLING: Status: ACTIVE | Noted: 2024-08-07

## 2024-09-26 PROBLEM — T74.21XA ADULT SEXUAL ABUSE: Status: ACTIVE | Noted: 2018-12-11

## 2024-09-26 PROBLEM — R53.83 FATIGUE: Status: ACTIVE | Noted: 2022-07-20

## 2024-09-26 PROBLEM — K20.0 EOSINOPHILIC ESOPHAGITIS: Status: ACTIVE | Noted: 2022-11-01

## 2024-09-26 PROBLEM — N93.9 ABNORMAL UTERINE BLEEDING (AUB): Status: ACTIVE | Noted: 2018-12-11

## 2024-09-26 PROBLEM — F33.2 SEVERE EPISODE OF RECURRENT MAJOR DEPRESSIVE DISORDER, WITHOUT PSYCHOTIC FEATURES (H): Chronic | Status: ACTIVE | Noted: 2020-01-20

## 2024-09-26 PROBLEM — F32.5 DEPRESSION, MAJOR, IN REMISSION (H): Status: ACTIVE | Noted: 2022-02-02

## 2024-09-26 PROBLEM — M79.89 SWELLING OF HAND: Status: ACTIVE | Noted: 2021-03-18

## 2024-09-26 PROBLEM — F51.01 INSOMNIA, IDIOPATHIC: Status: ACTIVE | Noted: 2023-08-02

## 2024-09-26 PROBLEM — K31.9 GASTROPATHY: Status: ACTIVE | Noted: 2022-11-02

## 2024-09-26 PROCEDURE — 99213 OFFICE O/P EST LOW 20 MIN: CPT | Mod: 95

## 2024-09-26 PROCEDURE — G2211 COMPLEX E/M VISIT ADD ON: HCPCS | Mod: 95

## 2024-09-26 RX ORDER — BUPROPION HYDROCHLORIDE 150 MG/1
TABLET ORAL
COMMUNITY
Start: 2024-05-22

## 2024-09-26 ASSESSMENT — PAIN SCALES - GENERAL: PAINLEVEL: NO PAIN (0)

## 2024-09-26 NOTE — PATIENT INSTRUCTIONS
"Thank you for allowing us the privilege of caring for you. We hope we provided you with the excellent service you deserve.   Please let us know if there is anything else we can do for you so that we can be sure you are completely satisfied with your care experience.    To ensure the quality of our services you may be receiving a patient satisfaction survey from an independent patient satisfaction monitoring company.    The greatest compliment you can give is a \"Likely to Recommend\"    Your visit was with Jennifer Kovacs PA-C today.    Instructions per today's visit:     Kem Blanco, it was great to visit with you today.  Here is a review of our visit.  If our clinic scheduler is not able to reach you please call [phone] to schedule your next appointments.    Plan  Continue zepbound 12.5mg- refills available   Goals we discussed today:   60-90g protein per day   Weight training 2x a week   Labs ordered today: Pth, vitamin d, cmp, lipids, A1c   Follow up with Dr. Luz in 3 months   Dietician appointment- not needed through our program- has access to RD through therapist.   Keep up the excellent work!       Information about Video Visits with [employer]: video visit information  _________________________________________________________________________________________________________________________________________________________  If you are asked by your clinic team to have your blood pressure checked:  [facility] do offer several locations for blood pressure checks. Please follow the below link to schedule an appointment. Scheduling an appointment at the pharmacy for a blood pressure check is now preferred.    Appointment Plus (appointment-plus.Paddle (Mobile Payments))  _________________________________________________________________________________________________________________________________________________________  Important contact and scheduling information:  Please call our contact center at " [phone] to schedule your next appointments.  To find a lab location near you, please call [phone].  For any nursing questions or concerns call Claudia Valdes LPN at [phone] or Poly Francisco RN at [phone]  Please call during clinic hours Monday through Friday 8:00a - 4:00p if you have questions or you can contact us via [employer] at anytime and we will reply during clinic hours.    Lab results will be communicated through My Chart or letter (if My Chart not used). Please call the clinic if you have not received communication after 1 week or if you have any questions.?  Clinic Fax: [phone]    _________________________________________________________________________________________________________________________________________________________  Meal Replacement Products:    Here is the link to our new e-store where you can purchase our meal replacement products    [facility] E-Store  [email or web address]    The one week starter kit is a great way to sample a variety of products and see what works for you.    If you want more information about the product go to: [email or web address]    If you are an employee or [facility] or [facility] please contact your care team for a 10% estore discount    Free Shipping for orders over $75     Benefits of meal replacements products:    Portion and calorie control  Improved nutrition  Structured eating  Simplified food choices  Avoid contact with trigger foods  _________________________________________________________________________________________________________________________________________________________  Interested in working with a health ?  Health coaches work with you to improve your overall health and wellbeing.  They look at the whole person, and may involve discussion of different areas of life, including, but not limited to the four pillars of health (sleep, exercise, nutrition, and  stress management). Discuss with your care team if you would like to start working a health .  Health Coaching-3 Pack: Schedule by calling [phone]    $99 for three health coaching visits    Visits may be done in person or via phone    Coaching is a partnership between the  and the client; Coaches do not prescribe or diagnose    Coaching helps inspire the client to reach his/her personal goals   _________________________________________________________________________________________________________________________________________________________  24 Week Healthy Lifestyle Plan:    Our mission in the 24-week Healthy Lifestyle Plan is to provide you with individualized care by giving you the tools, education and support you need to lose weight and maintain a healthy lifestyle. In your 24-week journey, you ll be supported by a dedicated weight loss team that includes registered dietitians, medical weight management providers, health coaches, and nurses -- all with special expertise in weight loss -- to help you every step of the way.     Monthly meetings with your registered dietician or medical weight management provider help to review your progress, update your care plan, and make any adjustments needed to ensure success. Between these visits, weekly and bi-weekly health  visits will help you focus on the four pillars of weight loss -- stress, sleep, nutrition, and exercise -- and how you can best adapt each to achieve sustainable weight loss results.    In addition, you will be given exclusive access to online wellbeing classes through [employer].  Your initial visit will be with a medical weight management provider who will help to understand your weight loss goals and ensure this program is the right fit for you. Please let our team know if you are interested in the 24 week plan by sending a message to your care team or calling [phone] to  schedule.  _________________________________________________________________________________________________________________________________________________________  __________  [facility] Get Moving Program  Our team of physical therapists is trained to help you understand and take control of your condition. They will perform a thorough evaluation to determine your ability for activity and develop a customized plan to fit your goals and physical ability.  Scheduling: Unsure if the Get Moving program is right for you? Discuss the program with your medical provider or diabetes educator. You can also call us at [phone] to ask questions or schedule an appointment.   [facility] Get Moving Program  ____________________________________________________________________________________________________________________________________________________________________________   [facility] Diabetes Prevention Program (DPP)  If you have prediabetes and Medicare please contact us via [employer] to learn more about the Diabetes Prevention Program (DPP)  Program Details:   [facility] offers the year-long Diabetes Prevention Program (DPP). The program helps you to make lifestyle changes that prevent or delay type 2 diabetes by supporting healthy eating, increased physical activity, stress reduction and use of coping skills.   On average, previous [facility] DPP cohorts have lost and maintained at least 5% of their starting weight throughout the program and averaged more than 150 minutes of physical activity per week.  Participants meet weekly for one-hour group sessions over sixteen weeks, every other week for the next 8 weeks, and monthly for the last six months.   A year-long maintenance program is also available for participants who complete the first year.   Location & Cost:   During the COVID-19 Public Health Emergency, the program is offered virtually. When in-person classes can resume, they  will be held at [facility].  For people with Medicare, the program is covered in full. A self-pay option will also be available for those with non-Medicare insurance plans.   ______________________________________________________________________________________________________________________________________________________________________________________________________________________________    To work with a Behavioral Health Psychologist:    Call to schedule:    Oc Dunbar - [phone]  Romeo Patrick - [phone]  Chelsey Soliman - [phone]  Nasrin Godfrey - [phone]   Sayda Baum PhD (cannot accept Medicare) [phone]        Thank you,   [facility] Comprehensive Weight Management Team

## 2024-09-26 NOTE — NURSING NOTE
Current patient location: [address] [address]    Is the patient currently in the state of [place]? YES    Visit mode:VIDEO    If the visit is dropped, the patient can be reconnected by: VIDEO VISIT: Text to cell phone:   Telephone Information:   Mobile [phone]       Will anyone else be joining the visit? NO  (If patient encounters technical issues they should call [phone])    How would you like to obtain your AVS? MyChart    Are changes needed to the allergy or medication list? No    Are refills needed on medications prescribed by this physician? Discuss with provider for zepbound 15mg    Rooming Documentation:  Questionnaire(s) completed    Reason for visit: AAKASH MACIELF

## 2024-09-26 NOTE — PROGRESS NOTES
Virtual Visit Details    Type of service:  Video Visit     Originating Location (pt. Location): Home    Distant Location (provider location):  Off-site  Platform [facility] for Video Visit: [facility]     Poly Blanco  MRN:  [id number]  :  1975  BERTRAM:  2024    Dear Yordy Parker MD,    I had the pleasure of seeing your patient Poly Blanco. She is a 48 year old female who I am continuing to see for treatment of obesity related to:        2022     2:25 PM   --   I have the following health issues associated with obesity Stress Incontinence   I have the following symptoms associated with obesity Lower Extremity Swelling    Hip Pain       Assessment & Plan   Problem List Items Addressed This Visit       Obesity    Relevant Orders    Hemoglobin A1c    Lipid panel reflex to direct LDL Fasting    Parathyroid Hormone Intact    Vitamin D Deficiency    Comprehensive metabolic panel      Plan  Continue zepbound 12.5mg- refills available   Goals we discussed today:   60-90g protein per day   Weight training 2x a week   Labs ordered today: Pth, vitamin d, cmp, lipids, A1c   Follow up with Dr. Luz in 3 months   Dietician appointment- not needed through our program- has access to RD through therapist.   Keep up the excellent work!     INTERVAL HISTORY:  MWM with Sarahi Monroe and Dr. Luz, last seen by Dr. Luz 3/5/24   Last seen by Sarahi Monroe Dec 2023.     Was initially on Saxenda and switched with Wegovy in 2023.  She is currently on Wegovy 2.4 mg weekly.  Wegovy helps to cut down food thought and binges eating   Lost 30 lbs which is about 13% of TBW.Weight has been plateau.   Would like to try Zepbound.     Exercise: palates and walking    -started zepbound     Today in visit 24  Feeling good with zepbound, feels it's a very helpful med   Lots of stress lately with regards to  who is dealing with memory issues- Poly is struggling with balancing caring  "for him and working full time .    Wt Readings from Last 5 Encounters:   09/26/24 88.5 kg (195 lb)   03/05/24 90.3 kg (199 lb 1.6 oz)   12/27/23 89.4 kg (197 lb)   09/05/23 90 kg (198 lb 8 oz)   05/16/23 94.8 kg (209 lb)       Anti-obesity medication history    Current:   Zepbound 12.5mg- \"it's fixed my brain.\" Really helpful with food noise, reducing binging, has been a big relief for her.     GERD symptoms: denies     Constipation/Diarrhea: none     Recent diet changes: smaller portions, no longer overeating unhealthy foods.     Protein - not focusing on this, discussed goal of 60-90g protein       Recent exercise/activity changes: joined [facility]: elliptical/ rowing machine/ weight lifting/ treadmill 3-5 times a week. Also doing reformer pilates 3-5 days a week.   Feeling great with this increase in exercise, noticing mental health is much better.     Recent stressors: stress is\"through the roof.\" Partner has been having worsened cognitive decline (22 years older than her), is really straining for her to take care of him. Feels balancing his care and working a full time job are really affecting her weight plateau lately.     Is seeing a therapist regularly, getting a lot of support for this.     Vitamins/Labs: Pth, vitamin d, cmp, lipids, A1c. Not taking vitamins regularly.       CURRENT WEIGHT:   195 lbs 0 oz    Initial Weight (lbs): 230 lbs  Last Visits Weight: 90.3 kg (199 lb 1.6 oz)  Cumulative weight loss (lbs): 35  Weight Loss Percentage: 15.22%        9/26/2024     3:25 PM   Changes and Difficulties   I have made the following changes to my diet since my last visit: eating less, not really   I have made the following changes to my activity/exercise since my last visit: joined the , increased time, weight training             MEDICATIONS:   Current Outpatient Medications   Medication Sig Dispense Refill    buPROPion (WELLBUTRIN XL) 150 MG 24 hr tablet       lisdexamfetamine (VYVANSE) 40 MG capsule Take 40 mg " "by mouth every morning      Multiple Vitamin (MULTIVITAMIN OR) Take  by mouth.        tirzepatide-Weight Management (ZEPBOUND) 12.5 MG/0.5ML prefilled pen Inject 0.5 mLs (12.5 mg) subcutaneously every 7 days. 6 mL 2    tretinoin (RETIN-A) 0.025 % external cream       triamcinolone (KENALOG) 0.1 % external ointment       insulin pen needle (31G X 5 MM) 31G X 5 MM miscellaneous Use 1 pen needle daily with Saxenda or as directed. 100 each 3    Multiple Vitamin (ONE-A-DAY ESSENTIAL) TABS Take 1 tablet by mouth daily      multivitamin w/minerals (MULTI-VITAMIN) tablet Take 1 tablet by mouth             9/26/2024     3:25 PM   Weight Loss Medication History Reviewed With Patient   Are you having any side effects from the weight loss medication that we have prescribed you? No                No data to display                  PHYSICAL EXAM:  Objective    Ht 1.727 m (5' 8\")   Wt 88.5 kg (195 lb)   BMI 29.65 kg/m      Vitals - Patient Reported  Pain Score: No Pain (0)      Vitals:  No vitals were obtained today due to virtual visit.    GENERAL: alert and no distress  EYES: Eyes grossly normal to inspection.  No discharge or erythema, or obvious scleral/conjunctival abnormalities.  RESP: No audible wheeze, cough, or visible cyanosis.    SKIN: Visible skin clear. No significant rash, abnormal pigmentation or lesions.  NEURO: Cranial nerves grossly intact.  Mentation and speech appropriate for age.  PSYCH: Appropriate affect, tone, and pace of words        Sincerely,    Jennifer Kovacs PA-C      25 minutes spent by me on the date of the encounter doing chart review, history and exam, documentation and further activities per the note    The longitudinal plan of care for the diagnosis(es)/condition(s) as documented were addressed during this visit. Due to the added complexity in care, I will continue to support Poly in the subsequent management and with ongoing continuity of care.   "

## 2024-09-26 NOTE — LETTER
2024       RE: Poly Blanco  [address]  [facility] [address]     Dear Colleague,    Thank you for referring your patient, Poly Blanco, to the [facility] WEIGHT MANAGEMENT CLINIC [place] [facility]. Please see a copy of my visit note below.    Virtual Visit Details    Type of service:  Video Visit     Originating Location (pt. Location): Home    Distant Location (provider location):  Off-site  Platform used for Video Visit: [facility] Medical Weight Management Note     Poly Blanco  MRN:  [id number]  :  1975  BERTRAM:  2024    Dear Yordy Parker MD,    I had the pleasure of seeing your patient Poly Blanco. She is a 48 year old female who I am continuing to see for treatment of obesity related to:        2022     2:25 PM   --   I have the following health issues associated with obesity Stress Incontinence   I have the following symptoms associated with obesity Lower Extremity Swelling    Hip Pain       Assessment & Plan  Problem List Items Addressed This Visit       Obesity    Relevant Orders    Hemoglobin A1c    Lipid panel reflex to direct LDL Fasting    Parathyroid Hormone Intact    Vitamin D Deficiency    Comprehensive metabolic panel      Plan  Continue zepbound 12.5mg- refills available   Goals we discussed today:   60-90g protein per day   Weight training 2x a week   Labs ordered today: Pth, vitamin d, cmp, lipids, A1c   Follow up with Dr. Luz in 3 months   Dietician appointment- not needed through our program- has access to RD through therapist.   Keep up the excellent work!     INTERVAL HISTORY:  MWM with Sarahi Monroe and Dr. Luz, last seen by Dr. Luz 3/5/24   Last seen by Sarahi Monroe Dec 2023.     Was initially on Saxenda and switched with Wegovy in 2023.  She is currently on Wegovy 2.4 mg weekly.  Wegovy helps to cut down food thought and binges eating   Lost 30 lbs which is about 13% of  "TBW.Weight has been plateau.   Would like to try Zepbound.     Exercise: palates and walking    -started zepbound     Today in visit 9/26/24  Feeling good with zepbound, feels it's a very helpful med   Lots of stress lately with regards to  who is dealing with memory issues- Poly is struggling with balancing caring for him and working full time .    Wt Readings from Last 5 Encounters:   09/26/24 88.5 kg (195 lb)   03/05/24 90.3 kg (199 lb 1.6 oz)   12/27/23 89.4 kg (197 lb)   09/05/23 90 kg (198 lb 8 oz)   05/16/23 94.8 kg (209 lb)       Anti-obesity medication history    Current:   Zepbound 12.5mg- \"it's fixed my brain.\" Really helpful with food noise, reducing binging, has been a big relief for her.     GERD symptoms: denies     Constipation/Diarrhea: none     Recent diet changes: smaller portions, no longer overeating unhealthy foods.     Protein - not focusing on this, discussed goal of 60-90g protein       Recent exercise/activity changes: joined [employer]: elliptical/ rowing machine/ weight lifting/ treadmill 3-5 times a week. Also doing reformer pilates 3-5 days a week.   Feeling great with this increase in exercise, noticing mental health is much better.     Recent stressors: stress is\"through the roof.\" Partner has been having worsened cognitive decline (22 years older than her), is really straining for her to take care of him. Feels balancing his care and working a full time job are really affecting her weight plateau lately.     Is seeing a therapist regularly, getting a lot of support for this.     Vitamins/Labs: Pth, vitamin d, cmp, lipids, A1c. Not taking vitamins regularly.       CURRENT WEIGHT:   195 lbs 0 oz    Initial Weight (lbs): 230 lbs  Last Visits Weight: 90.3 kg (199 lb 1.6 oz)  Cumulative weight loss (lbs): 35  Weight Loss Percentage: 15.22%        9/26/2024     3:25 PM   Changes and Difficulties   I have made the following changes to my diet since my last visit: eating less, not really   I " "have made the following changes to my activity/exercise since my last visit: joined the y, increased time, weight training             MEDICATIONS:   Current Outpatient Medications   Medication Sig Dispense Refill     buPROPion (WELLBUTRIN XL) 150 MG 24 hr tablet        lisdexamfetamine (VYVANSE) 40 MG capsule Take 40 mg by mouth every morning       Multiple Vitamin (MULTIVITAMIN OR) Take  by mouth.         tirzepatide-Weight Management (ZEPBOUND) 12.5 MG/0.5ML prefilled pen Inject 0.5 mLs (12.5 mg) subcutaneously every 7 days. 6 mL 2     tretinoin (RETIN-A) 0.025 % external cream        triamcinolone (KENALOG) 0.1 % external ointment        insulin pen needle (31G X 5 MM) 31G X 5 MM miscellaneous Use 1 pen needle daily with Saxenda or as directed. 100 each 3     Multiple Vitamin (ONE-A-DAY ESSENTIAL) TABS Take 1 tablet by mouth daily       multivitamin w/minerals (MULTI-VITAMIN) tablet Take 1 tablet by mouth             9/26/2024     3:25 PM   Weight Loss Medication History Reviewed With Patient   Are you having any side effects from the weight loss medication that we have prescribed you? No                No data to display                  PHYSICAL EXAM:  Objective   Ht 1.727 m (5' 8\")   Wt 88.5 kg (195 lb)   BMI 29.65 kg/m      Vitals - Patient Reported  Pain Score: No Pain (0)      Vitals:  No vitals were obtained today due to virtual visit.    GENERAL: alert and no distress  EYES: Eyes grossly normal to inspection.  No discharge or erythema, or obvious scleral/conjunctival abnormalities.  RESP: No audible wheeze, cough, or visible cyanosis.    SKIN: Visible skin clear. No significant rash, abnormal pigmentation or lesions.  NEURO: Cranial nerves grossly intact.  Mentation and speech appropriate for age.  PSYCH: Appropriate affect, tone, and pace of words        Sincerely,    Jennifer Kovacs PA-C      25 minutes spent by me on the date of the encounter doing chart review, history and exam, documentation " and further activities per the note    The longitudinal plan of care for the diagnosis(es)/condition(s) as documented were addressed during this visit. Due to the added complexity in care, I will continue to support Poly in the subsequent management and with ongoing continuity of care.       Again, thank you for allowing me to participate in the care of your patient.      Sincerely,    Jennifer Kovacs PA-C

## 2024-10-11 ENCOUNTER — MYC MEDICAL ADVICE (OUTPATIENT)
Dept: ENDOCRINOLOGY | Facility: CLINIC | Age: 49
End: 2024-10-11
Payer: COMMERCIAL

## 2024-10-11 DIAGNOSIS — E66.811 CLASS 1 OBESITY DUE TO EXCESS CALORIES WITHOUT SERIOUS COMORBIDITY WITH BODY MASS INDEX (BMI) OF 33.0 TO 33.9 IN ADULT: ICD-10-CM

## 2024-10-11 DIAGNOSIS — E66.09 CLASS 1 OBESITY DUE TO EXCESS CALORIES WITHOUT SERIOUS COMORBIDITY WITH BODY MASS INDEX (BMI) OF 33.0 TO 33.9 IN ADULT: ICD-10-CM

## 2024-10-15 DIAGNOSIS — E66.09 CLASS 1 OBESITY DUE TO EXCESS CALORIES WITHOUT SERIOUS COMORBIDITY WITH BODY MASS INDEX (BMI) OF 33.0 TO 33.9 IN ADULT: Primary | ICD-10-CM

## 2024-10-15 DIAGNOSIS — E66.811 CLASS 1 OBESITY DUE TO EXCESS CALORIES WITHOUT SERIOUS COMORBIDITY WITH BODY MASS INDEX (BMI) OF 33.0 TO 33.9 IN ADULT: Primary | ICD-10-CM

## 2024-10-17 ENCOUNTER — TELEPHONE (OUTPATIENT)
Dept: ENDOCRINOLOGY | Facility: CLINIC | Age: 49
End: 2024-10-17
Payer: COMMERCIAL

## 2024-10-17 NOTE — TELEPHONE ENCOUNTER
PA Initiation    Medication: ZEPBOUND 15 MG/0.5ML SC SOAJ  Insurance Company: [employer] Non-Specialty PA's - Phone [phone] Fax [phone]  Pharmacy Filling the Rx: [facility] - [place], [place] - [address] [place] [address]  Filling Pharmacy Phone: [phone]  Filling Pharmacy Fax: [phone]  Start Date: 10/17/2024

## 2024-10-21 NOTE — TELEPHONE ENCOUNTER
Prior Authorization Approval    Medication: ZEPBOUND 15 MG/0.5ML SC SOAJ  Authorization Effective Date: 9/17/2024  Authorization Expiration Date: 10/17/2025  Approved Dose/Quantity: 1 month  Reference #: [id number]   Insurance Company: [employer] Non-Specialty PA's - Phone [phone] Fax [phone]  Expected CoPay: $    CoPay Card Available:      Financial Assistance Needed:    Which Pharmacy is filling the prescription: [facility] [address]  Pharmacy Notified: pa renewal  Patient Notified: pa renewal

## 2025-03-21 DIAGNOSIS — E66.09 CLASS 1 OBESITY DUE TO EXCESS CALORIES WITHOUT SERIOUS COMORBIDITY WITH BODY MASS INDEX (BMI) OF 33.0 TO 33.9 IN ADULT: ICD-10-CM

## 2025-03-21 DIAGNOSIS — E66.811 CLASS 1 OBESITY DUE TO EXCESS CALORIES WITHOUT SERIOUS COMORBIDITY WITH BODY MASS INDEX (BMI) OF 33.0 TO 33.9 IN ADULT: ICD-10-CM

## 2025-04-01 ENCOUNTER — VIRTUAL VISIT (OUTPATIENT)
Dept: ENDOCRINOLOGY | Facility: CLINIC | Age: 50
End: 2025-04-01
Payer: COMMERCIAL

## 2025-04-01 VITALS — BODY MASS INDEX: 27.54 KG/M2 | WEIGHT: 181.1 LBS

## 2025-04-01 DIAGNOSIS — E66.811 CLASS 1 OBESITY DUE TO EXCESS CALORIES WITHOUT SERIOUS COMORBIDITY WITH BODY MASS INDEX (BMI) OF 33.0 TO 33.9 IN ADULT: ICD-10-CM

## 2025-04-01 DIAGNOSIS — E66.09 CLASS 1 OBESITY DUE TO EXCESS CALORIES WITHOUT SERIOUS COMORBIDITY WITH BODY MASS INDEX (BMI) OF 33.0 TO 33.9 IN ADULT: ICD-10-CM

## 2025-04-01 PROCEDURE — 98006 SYNCH AUDIO-VIDEO EST MOD 30: CPT | Performed by: INTERNAL MEDICINE

## 2025-04-01 PROCEDURE — G2211 COMPLEX E/M VISIT ADD ON: HCPCS | Performed by: INTERNAL MEDICINE

## 2025-04-01 NOTE — PROGRESS NOTES
Return Medical Weight Management Note     Poly Blanco  MRN:  [id number]  :  1975  BERTRAM: 2025    Dear Yordy Parker MD,    I had the pleasure of seeing your patient Poly Blanco. She is a 49 year old female who I am continuing to see for treatment of obesity related to: hip pain, stress incontinence, eosinophilic esophagitis        2022     2:25 PM   --   I have the following health issues associated with obesity Stress Incontinence   I have the following symptoms associated with obesity Lower Extremity Swelling    Hip Pain      Binges is a problem when she is alone. She usually lost weight when she stayed with partner (lost 20-30 lbs)  Binges eating might be trigger by some stresses     INTERVAL HISTORY:  Last seen by Jennifer MOJICA 2024.    Was initially on Saxenda and switched with Wegovy in 2023  Switched to Zepbound 3/2024  Increase to Zepbound 15 mg since 2024.    Feels that Zepbound is better help to cut down food thought and binges eating   Lost 33 lbs which is about 15% of TBW.    Exercise: still doing piliates. Less walking and muscle training    Has some mental health challenges in the past several months. Has medication adjustment. Feels more motivated for exercise.    CURRENT WEIGHT:   198 lbs    Initial weight 214 on 2023  Weight at the last visit 195 on 2024  Today's weight 181 lbs  Total weight loss 33 lbs (15.4%)        2025     1:10 PM   Changes and Difficulties   With regards to my diet, I am still struggling with: Winter depression, feeling overwhelmed.   With regards to my activity/exercise, I am still struggling with: Winter depression, feeling overwhelmed.     VITALS:  Wt 82.1 kg (181 lb 1.6 oz)   BMI 27.54 kg/m      MEDICATIONS:   Current Outpatient Medications   Medication Sig Dispense Refill    buPROPion (WELLBUTRIN XL) 150 MG 24 hr tablet       insulin pen needle (31G X 5 MM) 31G X 5 MM miscellaneous Use 1 pen needle daily with Saxenda or as directed.  100 each 3    lisdexamfetamine (VYVANSE) 40 MG capsule Take 40 mg by mouth every morning      Multiple Vitamin (MULTIVITAMIN OR) Take  by mouth.        Multiple Vitamin (ONE-A-DAY ESSENTIAL) TABS Take 1 tablet by mouth daily      multivitamin w/minerals (MULTI-VITAMIN) tablet Take 1 tablet by mouth      tirzepatide-Weight Management (ZEPBOUND) 12.5 MG/0.5ML prefilled pen Inject 0.5 mLs (12.5 mg) subcutaneously every 7 days. 6 mL 2    tirzepatide-Weight Management (ZEPBOUND) 15 MG/0.5ML prefilled pen Inject 0.5 mLs (15 mg) subcutaneously every 7 days. 6 mL 1    tretinoin (RETIN-A) 0.025 % external cream       triamcinolone (KENALOG) 0.1 % external ointment              4/1/2025     1:10 PM   Weight Loss Medication History Reviewed With Patient   Are you having any side effects from the weight loss medication that we have prescribed you? No       ASSESSMENT:   Poly Blanco is a 49 year old female who I am continuing to see for treatment of obesity related to: hip pain, stress incontinence, eosinophilic esophagitis    Responded well to Wegovy and then switched to Zepbound -- worked better  No side effects  Lost 33 lbs~15% TBW    PLAN:   Continue Zepbound 15 mg weekly  Continue to work on diet  Emphasize on adding exercise    FOLLOW-UP:    Jose L Rodriguez in 6 months    Joined the call at 4/1/2025, 1:33:09 pm.  Left the call at 4/1/2025, 1:38:15 pm.  You were on the call for 5 minutes 6 seconds .    Sincerely,    Sukh Coughlin MD

## 2025-04-01 NOTE — NURSING NOTE
Is the patient currently in the state of [place]? YES    Location: work    Visit mode:VIDEO    If the visit is dropped, the patient can be reconnected by: VIDEO VISIT: Text to cell phone:   Telephone Information:   Mobile [phone]       Will anyone else be joining the visit? NO  (If patient encounters technical issues they should call [phone])    Are changes needed to the allergy or medication list? No    Are refills needed on medications prescribed by this physician? NO    Reason for visit: AAKASH Rosas, Virtual Visit Facilitator    QNR Status: completed

## 2025-04-01 NOTE — PATIENT INSTRUCTIONS
PLAN:   Continue Zepbound 15 mg weekly  Continue to work on diet and exercise  Emphasize on exercise    See Jennifer in 6 months    If you have any questions, please do not hesitate to call Weight management clinic at [phone] or [phone].  If you need to fax, please fax to [phone].    Sincerely,    Sukh Coughlin MD  Endocrinology

## 2025-04-01 NOTE — LETTER
2025       RE: Poly Blanco  [address] [facility] [address]     Dear Colleague,    Thank you for referring your patient, Poly Blanco, to the [facility] WEIGHT MANAGEMENT CLINIC [facility]. Please see a copy of my visit note below.    Virtual Visit Details    Type of service:  Video Visit     Originating Location (pt. Location): Home    Distant Location (provider location):  Off-site  Platform used for Video Visit: [facility] Medical Weight Management Note     Poly Blanco  MRN:  [id number]  :  1975  BERTRAM: 2025    Dear Yordy Parker MD,    I had the pleasure of seeing your patient Poly Blanco. She is a 49 year old female who I am continuing to see for treatment of obesity related to: hip pain, stress incontinence, eosinophilic esophagitis        2022     2:25 PM   --   I have the following health issues associated with obesity Stress Incontinence   I have the following symptoms associated with obesity Lower Extremity Swelling    Hip Pain      Binges is a problem when she is alone. She usually lost weight when she stayed with partner (lost 20-30 lbs)  Binges eating might be trigger by some stresses     INTERVAL HISTORY:  Last seen by Jennifer MOJICA 2024.    Was initially on Saxenda and switched with Wegovy in 2023  Switched to Zepbound 3/2024  Increase to Zepbound 15 mg since 2024.    Feels that Zepbound is better help to cut down food thought and binges eating   Lost 33 lbs which is about 15% of TBW.    Exercise: still doing piliates. Less walking and muscle training    Has some mental health challenges in the past several months. Has medication adjustment. Feels more motivated for exercise.    CURRENT WEIGHT:   198 lbs    Initial weight 214 on 2023  Weight at the last visit 195 on 2024  Today's weight 181 lbs  Total weight loss 33 lbs (15.4%)        2025     1:10 PM   Changes and Difficulties    With regards to my diet, I am still struggling with: Winter depression, feeling overwhelmed.   With regards to my activity/exercise, I am still struggling with: Winter depression, feeling overwhelmed.     VITALS:  Wt 82.1 kg (181 lb 1.6 oz)   BMI 27.54 kg/m      MEDICATIONS:   Current Outpatient Medications   Medication Sig Dispense Refill     buPROPion (WELLBUTRIN XL) 150 MG 24 hr tablet        insulin pen needle (31G X 5 MM) 31G X 5 MM miscellaneous Use 1 pen needle daily with Saxenda or as directed. 100 each 3     lisdexamfetamine (VYVANSE) 40 MG capsule Take 40 mg by mouth every morning       Multiple Vitamin (MULTIVITAMIN OR) Take  by mouth.         Multiple Vitamin (ONE-A-DAY ESSENTIAL) TABS Take 1 tablet by mouth daily       multivitamin w/minerals (MULTI-VITAMIN) tablet Take 1 tablet by mouth       tirzepatide-Weight Management (ZEPBOUND) 12.5 MG/0.5ML prefilled pen Inject 0.5 mLs (12.5 mg) subcutaneously every 7 days. 6 mL 2     tirzepatide-Weight Management (ZEPBOUND) 15 MG/0.5ML prefilled pen Inject 0.5 mLs (15 mg) subcutaneously every 7 days. 6 mL 1     tretinoin (RETIN-A) 0.025 % external cream        triamcinolone (KENALOG) 0.1 % external ointment              4/1/2025     1:10 PM   Weight Loss Medication History Reviewed With Patient   Are you having any side effects from the weight loss medication that we have prescribed you? No       ASSESSMENT:   Poly Blanco is a 49 year old female who I am continuing to see for treatment of obesity related to: hip pain, stress incontinence, eosinophilic esophagitis    Responded well to Wegovy and then switched to Zepbound -- worked better  No side effects  Lost 33 lbs~15% TBW    PLAN:   Continue Zepbound 15 mg weekly  Continue to work on diet  Emphasize on adding exercise    FOLLOW-UP:    Jose L Rodriguez in 6 months    Joined the call at 4/1/2025, 1:33:09 pm.  Left the call at 4/1/2025, 1:38:15 pm.  You were on the call for 5 minutes 6 seconds  .    Sincerely,    Sukh Coughlin MD      Again, thank you for allowing me to participate in the care of your patient.      Sincerely,    Sukh Coughlin MD

## 2025-04-15 ENCOUNTER — TELEPHONE (OUTPATIENT)
Dept: ENDOCRINOLOGY | Facility: CLINIC | Age: 50
End: 2025-04-15
Payer: COMMERCIAL

## 2025-04-15 NOTE — TELEPHONE ENCOUNTER
Left Voicemail (1st Attempt) and Sent Mychart (1st Attempt) for the patient to call back and schedule the following:    Appointment type: [facility]  Provider: Jennifer Kovacs  Return date: around 10/1/25  Specialty phone number: [phone]  Additional appointment(s) needed: n/a  Additonal Notes: n/a